# Patient Record
Sex: MALE | Race: WHITE | NOT HISPANIC OR LATINO | Employment: FULL TIME | URBAN - METROPOLITAN AREA
[De-identification: names, ages, dates, MRNs, and addresses within clinical notes are randomized per-mention and may not be internally consistent; named-entity substitution may affect disease eponyms.]

---

## 2017-02-01 ENCOUNTER — ALLSCRIPTS OFFICE VISIT (OUTPATIENT)
Dept: OTHER | Facility: OTHER | Age: 47
End: 2017-02-01

## 2017-02-01 ENCOUNTER — HOSPITAL ENCOUNTER (OUTPATIENT)
Dept: RADIOLOGY | Facility: HOSPITAL | Age: 47
Discharge: HOME | End: 2017-02-01
Attending: FAMILY MEDICINE
Payer: COMMERCIAL

## 2017-02-01 ENCOUNTER — TRANSCRIBE ORDERS (OUTPATIENT)
Dept: ADMINISTRATIVE | Facility: HOSPITAL | Age: 47
End: 2017-02-01

## 2017-02-01 DIAGNOSIS — M79.671 PAIN OF RIGHT FOOT: ICD-10-CM

## 2017-02-01 PROCEDURE — 73650 X-RAY EXAM OF HEEL: CPT

## 2017-02-01 PROCEDURE — 73630 X-RAY EXAM OF FOOT: CPT

## 2017-02-02 ENCOUNTER — GENERIC CONVERSION - ENCOUNTER (OUTPATIENT)
Dept: OTHER | Facility: OTHER | Age: 47
End: 2017-02-02

## 2017-03-29 ENCOUNTER — ALLSCRIPTS OFFICE VISIT (OUTPATIENT)
Dept: OTHER | Facility: OTHER | Age: 47
End: 2017-03-29

## 2017-04-05 ENCOUNTER — GENERIC CONVERSION - ENCOUNTER (OUTPATIENT)
Dept: OTHER | Facility: OTHER | Age: 47
End: 2017-04-05

## 2017-04-05 LAB
A/G RATIO (HISTORICAL): 2.2 (ref 1.2–2.2)
ALBUMIN SERPL BCP-MCNC: 4.7 G/DL (ref 3.5–5.5)
ALP SERPL-CCNC: 88 IU/L (ref 39–117)
ALT SERPL W P-5'-P-CCNC: 57 IU/L (ref 0–44)
AMBIG ABBREV CMP14 DEFAULT (HISTORICAL): NORMAL
AMBIG ABBREV LP DEFAULT (HISTORICAL): NORMAL
AST SERPL W P-5'-P-CCNC: 27 IU/L (ref 0–40)
BASOPHILS # BLD AUTO: 0 %
BASOPHILS # BLD AUTO: 0 X10E3/UL (ref 0–0.2)
BILIRUB SERPL-MCNC: 0.4 MG/DL (ref 0–1.2)
BUN SERPL-MCNC: 13 MG/DL (ref 6–24)
BUN/CREA RATIO (HISTORICAL): 14 (ref 9–20)
CALCIUM SERPL-MCNC: 9.5 MG/DL (ref 8.7–10.2)
CHLORIDE SERPL-SCNC: 99 MMOL/L (ref 96–106)
CHOLEST SERPL-MCNC: 181 MG/DL (ref 100–199)
CO2 SERPL-SCNC: 24 MMOL/L (ref 18–29)
CREAT SERPL-MCNC: 0.92 MG/DL (ref 0.76–1.27)
DEPRECATED RDW RBC AUTO: 13.2 % (ref 12.3–15.4)
EGFR AFRICAN AMERICAN (HISTORICAL): 114 ML/MIN/1.73
EGFR-AMERICAN CALC (HISTORICAL): 99 ML/MIN/1.73
EOSINOPHIL # BLD AUTO: 0.4 X10E3/UL (ref 0–0.4)
EOSINOPHIL # BLD AUTO: 5 %
GLUCOSE SERPL-MCNC: 106 MG/DL (ref 65–99)
HCT VFR BLD AUTO: 47.5 % (ref 37.5–51)
HDLC SERPL-MCNC: 37 MG/DL
HGB BLD-MCNC: 16.4 G/DL (ref 12.6–17.7)
IMM.GRANULOCYTES (CD4/8) (HISTORICAL): 0 %
IMM.GRANULOCYTES (CD4/8) (HISTORICAL): 0 X10E3/UL (ref 0–0.1)
LDLC SERPL CALC-MCNC: 101 MG/DL (ref 0–99)
LYMPHOCYTES # BLD AUTO: 2.1 X10E3/UL (ref 0.7–3.1)
LYMPHOCYTES # BLD AUTO: 28 %
MCH RBC QN AUTO: 29.9 PG (ref 26.6–33)
MCHC RBC AUTO-ENTMCNC: 34.5 G/DL (ref 31.5–35.7)
MCV RBC AUTO: 87 FL (ref 79–97)
MONOCYTES # BLD AUTO: 0.6 X10E3/UL (ref 0.1–0.9)
MONOCYTES (HISTORICAL): 8 %
NEUTROPHILS # BLD AUTO: 4.4 X10E3/UL (ref 1.4–7)
NEUTROPHILS # BLD AUTO: 59 %
PLATELET # BLD AUTO: 191 X10E3/UL (ref 150–379)
POTASSIUM SERPL-SCNC: 4.5 MMOL/L (ref 3.5–5.2)
RBC (HISTORICAL): 5.49 X10E6/UL (ref 4.14–5.8)
SODIUM SERPL-SCNC: 141 MMOL/L (ref 134–144)
TOT. GLOBULIN, SERUM (HISTORICAL): 2.1 G/DL (ref 1.5–4.5)
TOTAL PROTEIN (HISTORICAL): 6.8 G/DL (ref 6–8.5)
TRIGL SERPL-MCNC: 215 MG/DL (ref 0–149)
VLDLC SERPL CALC-MCNC: 43 MG/DL (ref 5–40)
WBC # BLD AUTO: 7.5 X10E3/UL (ref 3.4–10.8)

## 2017-04-06 ENCOUNTER — GENERIC CONVERSION - ENCOUNTER (OUTPATIENT)
Dept: OTHER | Facility: OTHER | Age: 47
End: 2017-04-06

## 2017-04-06 DIAGNOSIS — R94.5 ABNORMAL RESULTS OF LIVER FUNCTION STUDIES: ICD-10-CM

## 2017-10-11 ENCOUNTER — ALLSCRIPTS OFFICE VISIT (OUTPATIENT)
Dept: OTHER | Facility: OTHER | Age: 47
End: 2017-10-11

## 2017-10-11 DIAGNOSIS — R53.83 OTHER FATIGUE: ICD-10-CM

## 2017-10-11 DIAGNOSIS — Z00.00 ENCOUNTER FOR GENERAL ADULT MEDICAL EXAMINATION WITHOUT ABNORMAL FINDINGS: ICD-10-CM

## 2017-10-11 DIAGNOSIS — I10 ESSENTIAL (PRIMARY) HYPERTENSION: ICD-10-CM

## 2017-10-11 DIAGNOSIS — E55.9 VITAMIN D DEFICIENCY: ICD-10-CM

## 2018-01-11 NOTE — PROGRESS NOTES
Assessment    1  Family history of hypertension (V17 49) (Z82 49) : Father, Brother   2  Encounter for preventive health examination (V70 0) (Z00 00)   3  Benign essential hypertension (401 1) (I10)    Plan  Benign essential hypertension, Health Maintenance    · (1) CBC/PLT/DIFF; Status:Active; Requested for:36Crz1609;    · (1) COMPREHENSIVE METABOLIC PANEL; Status:Active; Requested for:09Dsh5236;    · (1) LIPID PANEL, FASTING; Status:Active; Requested for:73Ojw4374;    · Follow-up visit in 6 months Evaluation and Treatment  Follow-up  Status: Hold For -  Scheduling  Requested for: 38Jgx7295  Flu vaccine need    · Fluzone Quadrivalent 0 5 ML Intramuscular Suspension Prefilled Syringe    Discussion/Summary  Impression: health maintenance visit, healthy adult male  Currently, he eats a healthy diet and has an inadequate exercise regimen  Prostate cancer screening: PSA is not indicated  Testicular cancer screening: the risks and benefits of testicular cancer screening were discussed, self testicular exam technique was taught, monthly self testicular exam was advised, testicular cancer screening is current and clinical testicular exam was done today  Colorectal cancer screening: colorectal cancer screening is not indicated  Screening lab work includes glucose, lipid profile and CBC ordered  The risks and benefits of immunizations were discussed, immunizations are up to date and Flu Vaccine was given today, and tolerated well  He was advised to be evaluated by a dentist  Advice and education were given regarding nutrition, aerobic exercise, weight loss and Pt to focus on a low carb / sodium diet, and regular exercise  Patient discussion: discussed with the patient, Acey Light is going to f/u in 6 months, or sooner PRN  Chief Complaint  Pt here for a wellness, previous pt Dr Elizabeth Cadet  History of Present Illness  , Adult Male: The patient is being seen for a health maintenance evaluation   The last health maintenance visit was year(s) ago  General Health: The patient's health since the last visit is described as good   PMHx: HTN  No surgeries  He does not have regular dental visits  He denies vision problems  He denies hearing loss  Immunizations status: up to date   Recommended that he sees a Dentist    Lifestyle:  He consumes a diverse and healthy diet  He does not have any weight concerns  He exercises regularly  He does not use tobacco  The patient is a former cigarette smoker  He denies alcohol use  He denies drug use  He does exercise  Reproductive health:  the patient is sexually active  He denies erectile dysfunction  Screening: cancer screening reviewed and current  metabolic screening reviewed and current  risk screening reviewed and current  Additional History:  Chronic issues with his allergies - using Flonase  No CP/SOB  No abd pain or rectal bleeding  Urine flow is normal  No ED  No anxiety or depression  No colon cancer; pt's father did have a dysplastic rectal polyp  HPI: As above  Review of Systems    Constitutional: as noted in HPI    ENT: as noted in HPI  Cardiovascular: as noted in HPI  Respiratory: as noted in HPI  Gastrointestinal: as noted in HPI  Genitourinary: as noted in HPI  Psychiatric: as noted in HPI  Active Problems    1  Allergic rhinitis (477 9) (J30 9)   2  Anxiety (300 00) (F41 9)   3  Benign essential hypertension (401 1) (I10)   4   Skin tag (701 9) (L91 8)    Past Medical History    · History of Acute laryngitis (464 00) (J04 0)   · Acute sinusitis (461 9) (J01 90)   · History of Dysfunction of eustachian tube, left (381 81) (H69 82)   · History of Dysfunction of eustachian tube, unspecified laterality (381 81) (H69 80)   · History of acute sinusitis (V12 69) (Z87 09)   · History of influenza (V12 09) (Z87 09)   · History of Plantar wart (078 12) (B07 0)   · History of Right knee pain (719 46) (M25 561)   · History of Skin rash (782 1) (R21)   · History of Strain of muscle or tendon of peroneal muscle group at lower leg level (844 8)  (Z28 372A)    Family History  Mother    · Family history of Diabetes Mellitus (V18 0)  Father    · Family history of Benign Essential Hypertension    Social History    · Denied: History of Alcohol   · Denied: History of Drug Use   · Education History   · COLLEGE GRAD   · Former smoker (X94 96) (Z48 403)   · Quit 1998   · Marital History -  (V61 03)   · 1 step-child, 1 child   · Occupation:   ·     Current Meds   1  Fluticasone Propionate 50 MCG/ACT Nasal Suspension; Use two (2) spray(s) into EACH   nostril DAILY during allergy season; Therapy: 54DFL5818 to (Evaluate:12Ian6628)  Requested for: 55MTA1674 Recorded   2  Lisinopril 5 MG Oral Tablet; TAKE ONE (1) TABLET(S) DAILY; Therapy: 17ECN4660 to (Evaluate:84Atz6527)  Requested for: 46BQB1056; Last   Rx:78Bgh1582 Ordered    Allergies    1  No Known Drug Allergies    Vitals   Recorded: 37DNZ8103 37:90LB   Systolic 268   Diastolic 78   Heart Rate 64   Respiration 16   Height 5 ft 10 55 in   Weight 217 lb    BMI Calculated 30 65   BSA Calculated 2 17     Physical Exam    Constitutional   General appearance: No acute distress, well appearing and well nourished  NAD; VSS; pleasant  Head and Face   Head and face: Normal     Eyes   Conjunctiva and lids: No erythema, swelling or discharge  Ears, Nose, Mouth, and Throat   External inspection of ears and nose: Normal     Otoscopic examination: Tympanic membranes translucent with normal light reflex  Canals patent without erythema  Hearing: Normal     Lips, teeth, and gums: Normal, good dentition  Pt does need a dental cleaning  Oropharynx: Normal with no erythema, edema, exudate or lesions  Neck   Neck: Supple, symmetric, trachea midline, no masses  Pulmonary   Respiratory effort: No increased work of breathing or signs of respiratory distress  Auscultation of lungs: Clear to auscultation  Cardiovascular   Auscultation of heart: Normal rate and rhythm, normal S1 and S2, no murmurs  Abdomen   Abdomen: Non-tender, no masses  Liver and spleen: No hepatomegaly or splenomegaly  Examination for hernias: No hernias appreciated  Genitourinary   Scrotal contents: Normal testes, no masses  Penis: Normal, no lesions  Lymphatic   Palpation of lymph nodes in neck: No lymphadenopathy  Musculoskeletal   Gait and station: Normal     Psychiatric   Judgment and insight: Normal     Orientation to person, place and time: Normal     Recent and remote memory: Intact      Mood and affect: Normal        Future Appointments    Date/Time Provider Specialty Site   03/29/2017 08:30 AM Yu Krishna DO Family Medicine [facility]     Signatures   Electronically signed by : Roselyn Ramírez DO; Sep 29 2016 10:24AM EST                       (Author)

## 2018-01-12 VITALS
SYSTOLIC BLOOD PRESSURE: 124 MMHG | DIASTOLIC BLOOD PRESSURE: 78 MMHG | BODY MASS INDEX: 31.83 KG/M2 | HEART RATE: 84 BPM | HEIGHT: 71 IN | WEIGHT: 227.38 LBS | RESPIRATION RATE: 16 BRPM

## 2018-01-12 NOTE — RESULT NOTES
Verified Results  * XR HEEL / CALCANEUS 2+ VIEW RIGHT 56Itz0269 09:25AM [facility] Order Number: [id number]     Test Name Result Flag Reference   XR HEEL / CALCANEUS 2+ VW RIGHT (Report)     RIGHT HEEL     INDICATION: Right heel pain  COMPARISON: None     VIEWS: Axial and lateral; 2 images     FINDINGS:     There is no acute fracture or dislocation  Small plantar calcaneal spur  No lytic or blastic lesions are seen  Soft tissues are unremarkable  IMPRESSION:     No acute osseous abnormality  Workstation performed: [id number]     Signed by:   Jose Barron MD   2/2/17     * XR FOOT 3+ VIEW RIGHT 66GVY4702 09:25AM [facility] Order Number: [id number]   Performing Comments: Pt with chronic pain of the right lenin-plantar aspect of the right heel  Thanks  Mary Marcus DO     Test Name Result Flag Reference   XR FOOT 3+ VW RIGHT (Report)     RIGHT FOOT     INDICATION: Right foot pain  COMPARISON: None     VIEWS: AP, lateral and oblique ; 3 images     FINDINGS:     There is no acute fracture or dislocation  Small plantar calcaneal spur  No lytic or blastic lesions are seen  Soft tissues are unremarkable  IMPRESSION:     No acute osseous abnormality  Workstation performed: [id number]     Signed by:   Jose Barron MD   2/2/17

## 2018-01-13 VITALS
HEART RATE: 72 BPM | HEIGHT: 71 IN | BODY MASS INDEX: 31.64 KG/M2 | RESPIRATION RATE: 16 BRPM | WEIGHT: 226 LBS | DIASTOLIC BLOOD PRESSURE: 86 MMHG | SYSTOLIC BLOOD PRESSURE: 128 MMHG

## 2018-01-14 NOTE — RESULT NOTES
Verified Results  (1) CBC/PLT/DIFF 93KOZ2830 08:01AM [facility]     Test Name Result Flag Reference   WBC 6 9 x10E3/uL  3 4-10 8   RBC 5 47 x10E6/uL  4 14-5 80   Hemoglobin 16 1 g/dL  12 6-17 7   Hematocrit 47 2 %  37 5-51 0   MCV 86 fL  79-97   MCH 29 4 pg  26 6-33 0   MCHC 34 1 g/dL  31 5-35 7   RDW 13 2 %  12 3-15 4   Platelets 717 W32D9/YT  150-379   Neutrophils 60 %     Lymphs 29 %     Monocytes 7 %     Eos 4 %     Basos 0 %     Neutrophils (Absolute) 4 1 x10E3/uL  1 4-7 0   Lymphs (Absolute) 2 0 x10E3/uL  0 7-3 1   Monocytes(Absolute) 0 5 x10E3/uL  0 1-0 9   Eos (Absolute) 0 3 x10E3/uL  0 0-0 4   Baso (Absolute) 0 0 x10E3/uL  0 0-0 2   Immature Granulocytes 0 %     Immature Grans (Abs) 0 0 x10E3/uL  0 0-0 1     (1) COMPREHENSIVE METABOLIC PANEL 50HVW2178 96:23AA [facility]     Test Name Result Flag Reference   Glucose, Serum 98 mg/dL  65-99   BUN 12 mg/dL  6-24   Creatinine, Serum 0 82 mg/dL  0 76-1 27   eGFR If NonAfricn Am 107 mL/min/1 73  >59   eGFR If Africn Am 123 mL/min/1 73  >59   BUN/Creatinine Ratio 15  9-20   Sodium, Serum 140 mmol/L  134-144   Potassium, Serum 4 8 mmol/L  3 5-5 2   Chloride, Serum 99 mmol/L     Carbon Dioxide, Total 22 mmol/L  18-29   Calcium, Serum 9 7 mg/dL  8 7-10 2   Protein, Total, Serum 7 1 g/dL  6 0-8 5   Albumin, Serum 4 5 g/dL  3 5-5 5   Globulin, Total 2 6 g/dL  1 5-4 5   A/G Ratio 1 7  1 1-2 5   Bilirubin, Total 0 6 mg/dL  0 0-1 2   Alkaline Phosphatase, S 81 IU/L     AST (SGOT) 31 IU/L  0-40   ALT (SGPT) 45 IU/L H 0-44     (1) TSH WITH FT4 REFLEX [id number] 08:01AM [facility]     Test Name Result Flag Reference   TSH 1 130 uIU/mL  0 450-4 500     (1) URINALYSIS (will reflex a microscopy if leukocytes, occult blood, protein or nitrites are not within normal limits) 49GEW1948 08:01AM [facility]     Test Name Result Flag Reference   Specific Gravity 1 019  1 005-1 030   pH 6 0  5 0-7 5   Urine-Color Yellow  Yellow   Appearance Clear Clear   WBC Esterase Negative  Negative   Protein Negative  Negative/Trace   Glucose Negative  Negative   Ketones Negative  Negative   Occult Blood Negative  Negative   Bilirubin Negative  Negative   Urobilinogen,Semi-Qn 0 2 EU/dL  0 2-1 0   Nitrite, Urine Negative  Negative   Microscopic Examination Comment     Microscopic follows if indicated  [facility]) Lipid Panel 99FUB4142 08:01AM [facility]     Test Name Result Flag Reference   Cholesterol, Total 161 mg/dL  100-199   Triglycerides 113 mg/dL  0-149   HDL Cholesterol 39 mg/dL L >39   According to ATP-III Guidelines, HDL-C >59 mg/dL is considered a  negative risk factor for CHD  VLDL Cholesterol Cali 23 mg/dL  5-40   LDL Cholesterol Calc 99 mg/dL  0-99     (1) LDL,DIRECT 70KUG5322 08:01AM [facility]     Test Name Result Flag Reference   LDL Chol   (Direct) 99 mg/dL  0-99

## 2018-01-14 NOTE — PROGRESS NOTES
Assessment    1  Encounter for preventive health examination (V70 0) (Z00 00)   2  Benign essential hypertension (401 1) (I10)   3  Colon cancer screening (V76 51) (Z12 11)   4  Vitamin D deficiency (268 9) (E55 9)   5  Fatigue (780 79) (R53 83)    Plan  Benign essential hypertension, Health Maintenance    · (1) COMPREHENSIVE METABOLIC PANEL; Status:Active; Requested for:11Oct2017;    · (1) LIPID PANEL, FASTING; Status:Active; Requested for:11Oct2017;    · Follow-up visit in 6 months Evaluation and Treatment  Follow-up  Status: Complete   Done: 40SZE8289   · Influenza  Colon cancer screening    · 2 - Ace Party MD, Betty Henley  Colorectal Surgery, Gastroenterology Co-Management  *   51 yo male, with father - with hx of dysplastic colonic polyp in the past  - requesting CR Surgery for evaluation  Thanks  Kristen Chi DO  Status: Complete   Done: 06HLV6694  Care Summary provided  : Yes  Fatigue, Vitamin D deficiency    · (1) CBC/PLT/DIFF; Status:Active; Requested for:11Oct2017;    · (1) TSH WITH FT4 REFLEX; Status:Active; Requested for:11Oct2017;    · (1) VITAMIN D 25-HYDROXY; Status:Active; Requested for:11Oct2017;     Discussion/Summary  Impression: health maintenance visit, healthy adult male  Currently, he eats a healthy diet and has an inadequate exercise regimen  Prostate cancer screening: PSA is not indicated  Colorectal cancer screening: the risks and benefits of colorectal cancer screening were discussed and Pt was referred to CR Surgery for an evaluation - given his family hx  Screening lab work includes glucose, lipid profile, 25-hydroxyvitamin D and CBC and TSH  The risks and benefits of immunizations were discussed, immunizations are up to date and Flu Vaccine was given IM today, and tolerated well  Advice and education were given regarding nutrition, aerobic exercise, weight loss and Elwanda Grit is to focus on a lower carb / low salt diet, regular exercise, and working on weight loss   Patient discussion: discussed with the patient, He is to f/u in 6 months, or sooner PRN  BP is adequately controlled on present management  Chief Complaint  PT is being seen today for a HM visit  BP on his wrist cuff - left 126/84, pulse of 87 in clinic today  History of Present Illness  HM, Adult Male: The patient is being seen for a health maintenance evaluation  The last health maintenance visit was 1 year(s) ago  General Health: The patient's health since the last visit is described as good   Allergies doing pretty well right now  Were worse in 09/2017  He has regular dental visits  He denies vision problems  He denies hearing loss  Immunizations status: up to date  Lifestyle:  He consumes a diverse and healthy diet  He does not have any weight concerns  He does not exercise regularly  He does not use tobacco  The patient is a former cigarette smoker  He consumes alcohol  He reports occasional alcohol use  He denies drug use  Reproductive health:  the patient is sexually active  He denies erectile dysfunction  Screening: cancer screening reviewed and current  metabolic screening reviewed and current  risk screening reviewed and current  Additional History:  No CP/SOB  No abd pain or rectal bleeding at this time (has had previously; we re-discussed his family hx today - father with hx of a dysplastic colonic polyp in the past)  Urine flow is fine  No ED  No anxiety or depression  HPI: As above  Review of Systems    Constitutional: as noted in HPI  Cardiovascular: as noted in HPI  Respiratory: as noted in HPI  Gastrointestinal: as noted in HPI  Genitourinary: as noted in HPI  Psychiatric: as noted in HPI  Active Problems    1  Acute maxillary sinusitis (461 0) (J01 00)   2  Allergic rhinitis (477 9) (J30 9)   3  Anxiety (300 00) (F41 9)   4  Benign essential hypertension (401 1) (I10)   5  Elevated LFTs (790 6) (R79 89)   6  Flu vaccine need (V04 81) (Z23)   7   Heel pain, chronic, right (662 8,391 85) (M79 671,G89 29)   8  Skin tag (701 9) (L91 8)   9  Vertigo (780 4) (R42)    Past Medical History    · History of Acute laryngitis (464 00) (J04 0)   · Acute sinusitis (461 9) (J01 90)   · History of Dysfunction of eustachian tube, left (381 81) (H69 82)   · History of Dysfunction of Eustachian tube, unspecified laterality (381 81) (H69 80)   · History of acute sinusitis (V12 69) (Z87 09)   · History of influenza (V12 09) (Z87 09)   · History of Plantar wart (078 12) (B07 0)   · History of Right knee pain (719 46) (M25 561)   · History of Skin rash (782 1) (R21)   · History of Strain of muscle or tendon of peroneal muscle group at lower leg level (844 8)  (S86 319A)    Family History  Mother    · Family history of Diabetes Mellitus (V18 0)  Father    · Family history of Benign Essential Hypertension   · Family history of hypertension (V17 49) (Z82 49)  Brother    · Family history of hypertension (V17 49) (Z82 49)    Social History    · Denied: History of Alcohol   · Denied: History of Drug Use   · Education History   · COLLEGE GRAD   · Former smoker (Q65 94) (J20 385)   · Quit 1998   · Marital History -  (V61 03)   · 1 step-child, 1 child   · Occupation:   ·     Current Meds   1  Fluticasone Propionate 50 MCG/ACT Nasal Suspension; Use two (2) spray(s) into EACH   nostril DAILY during allergy season; Therapy: 44ZDN5930 to (Larissa Santos)  Requested for: 77LSV1944; Last   Rx:40Clb1888 Ordered   2  Lisinopril 5 MG Oral Tablet; TAKE ONE (1) TABLET(S) DAILY; Therapy: 54VQB4791 to ()  Requested for: 95PVL9795; Last   Rx:40Vlq5023 Ordered    Allergies    1   No Known Drug Allergies    Vitals   Recorded: 40IMI4016 08:33AM   Heart Rate 88   Respiration 16   Systolic 845   Diastolic 88   Height 5 ft 10 87 in   Weight 218 lb 4 oz   BMI Calculated 30 55   BSA Calculated 2 19     Physical Exam    Constitutional   General appearance: No acute distress, well appearing and well nourished  NAD; VSS; pleasant  Head and Face   Head and face: Normal     Eyes   Conjunctiva and lids: No erythema, swelling or discharge  Ears, Nose, Mouth, and Throat   External inspection of ears and nose: Normal     Otoscopic examination: Tympanic membranes translucent with normal light reflex  Canals patent without erythema  Hearing: Normal     Lips, teeth, and gums: Normal, good dentition  Oropharynx: Normal with no erythema, edema, exudate or lesions  Neck   Neck: Supple, symmetric, trachea midline, no masses  Pulmonary   Respiratory effort: No increased work of breathing or signs of respiratory distress  Auscultation of lungs: Clear to auscultation  Cardiovascular   Auscultation of heart: Normal rate and rhythm, normal S1 and S2, no murmurs  Abdomen   Abdomen: Non-tender, no masses  Liver and spleen: No hepatomegaly or splenomegaly  Musculoskeletal   Gait and station: Normal     Psychiatric   Judgment and insight: Normal     Orientation to person, place and time: Normal     Recent and remote memory: Intact      Mood and affect: Normal        Future Appointments    Date/Time Provider Specialty Site   04/11/2018 08:45 AM Mena Salinas DO Family Medicine [id number] [facility]     Signatures   Electronically signed by : Oj Higgins DO; Oct 11 2017 12:41PM EST                       (Author)

## 2018-01-15 NOTE — RESULT NOTES
Verified Results  (1) CBC/PLT/DIFF 05Apr2017 07:31AM Omar Sheldon     Test Name Result Flag Reference   WBC 7 5 x10E3/uL  3 4-10 8   RBC 5 49 x10E6/uL  4 14-5 80   Hemoglobin 16 4 g/dL  12 6-17 7   Hematocrit 47 5 %  37 5-51 0   MCV 87 fL  79-97   MCH 29 9 pg  26 6-33 0   MCHC 34 5 g/dL  31 5-35 7   RDW 13 2 %  12 3-15 4   Platelets 050 W87O4/NA  150-379   Neutrophils 59 %     Lymphs 28 %     Monocytes 8 %     Eos 5 %     Basos 0 %     Neutrophils (Absolute) 4 4 x10E3/uL  1 4-7 0   Lymphs (Absolute) 2 1 x10E3/uL  0 7-3 1   Monocytes(Absolute) 0 6 x10E3/uL  0 1-0 9   Eos (Absolute) 0 4 x10E3/uL  0 0-0 4   Baso (Absolute) 0 0 x10E3/uL  0 0-0 2   Immature Granulocytes 0 %     Immature Grans (Abs) 0 0 x10E3/uL  0 0-0 1     (1) COMPREHENSIVE METABOLIC PANEL 99JXR0291 97:37UK Omar Sheldon     Test Name Result Flag Reference   Glucose, Serum 106 mg/dL H 65-99   BUN 13 mg/dL  6-24   Creatinine, Serum 0 92 mg/dL  0 76-1 27   eGFR If NonAfricn Am 99 mL/min/1 73  >59   eGFR If Africn Am 114 mL/min/1 73  >59   BUN/Creatinine Ratio 14  9-20   **Please note reference interval change**   Sodium, Serum 141 mmol/L  134-144   Potassium, Serum 4 5 mmol/L  3 5-5 2   Chloride, Serum 99 mmol/L     Carbon Dioxide, Total 24 mmol/L  18-29   Calcium, Serum 9 5 mg/dL  8 7-10 2   Protein, Total, Serum 6 8 g/dL  6 0-8 5   Albumin, Serum 4 7 g/dL  3 5-5 5   Globulin, Total 2 1 g/dL  1 5-4 5   A/G Ratio 2 2  1 2-2 2   **Please note reference interval change**   Bilirubin, Total 0 4 mg/dL  0 0-1 2   Alkaline Phosphatase, S 88 IU/L     AST (SGOT) 27 IU/L  0-40   ALT (SGPT) 57 IU/L H 0-44     (LC) Lipid Panel 05Apr2017 07:31AM Omar Sheldon     Test Name Result Flag Reference   Cholesterol, Total 181 mg/dL  100-199   Triglycerides 215 mg/dL H 0-149   HDL Cholesterol 37 mg/dL L >39   VLDL Cholesterol Cali 43 mg/dL H 5-40   LDL Cholesterol Calc 101 mg/dL H 0-99     (LC) Addis Lafleur CMP14 Default 05Apr2017 07:31AM Monalisa Omar     Test Name Result Flag Reference   Tam Marin CMP14 Default Comment     A hand-written panel/profile was received from your office  In  accordance with the [facility] Ambiguous Test Code Policy dated July 7587, we have completed your order by using the closest currently  or formerly recognized AMA panel  We have assigned Comprehensive  Metabolic Panel (14), Test Code #971720 to this request   If this  is not the testing you wished to receive on this specimen, please  contact the [address] [place] [address] Client Inquiry/Technical Services Department  to clarify the test order  We appreciate your business  [facility]) Tam Marin LP Default [id number] 07:31AM Omar Sheldon     Test Name Result Flag Reference   Ambig Abbrev LP Default Comment     A hand-written panel/profile was received from your office  In  accordance with the [facility] Ambiguous Test Code Policy dated July 8905, we have completed your order by using the closest currently  or formerly recognized AMA panel  We have assigned Lipid Panel,  Test Code #[id number] to this request  If this is not the testing you  wished to receive on this specimen, please contact the [address] [place] [address]  Client Inquiry/Technical Services Department to clarify the test  order  We appreciate your business         Plan  Elevated LFTs    · US RIGHT UPPER QUADRANT; Status:Hold For - Scheduling; Requested  for:06Apr2017;

## 2018-01-22 VITALS
HEIGHT: 71 IN | WEIGHT: 218.25 LBS | SYSTOLIC BLOOD PRESSURE: 130 MMHG | HEART RATE: 88 BPM | RESPIRATION RATE: 16 BRPM | DIASTOLIC BLOOD PRESSURE: 88 MMHG | BODY MASS INDEX: 30.56 KG/M2

## 2018-01-30 DIAGNOSIS — I10 ESSENTIAL HYPERTENSION: Primary | ICD-10-CM

## 2018-01-30 RX ORDER — LISINOPRIL 5 MG/1
TABLET ORAL
Qty: 90 TABLET | Refills: 3 | Status: SHIPPED | OUTPATIENT
Start: 2018-01-30 | End: 2019-01-26 | Stop reason: SDUPTHER

## 2018-04-18 ENCOUNTER — OFFICE VISIT (OUTPATIENT)
Dept: FAMILY MEDICINE CLINIC | Facility: CLINIC | Age: 48
End: 2018-04-18

## 2018-04-18 VITALS
DIASTOLIC BLOOD PRESSURE: 88 MMHG | BODY MASS INDEX: 31.41 KG/M2 | WEIGHT: 224.4 LBS | SYSTOLIC BLOOD PRESSURE: 136 MMHG | HEART RATE: 84 BPM | RESPIRATION RATE: 16 BRPM

## 2018-04-18 DIAGNOSIS — R53.83 FATIGUE, UNSPECIFIED TYPE: ICD-10-CM

## 2018-04-18 DIAGNOSIS — I10 ESSENTIAL HYPERTENSION: Primary | Chronic | ICD-10-CM

## 2018-04-18 DIAGNOSIS — E55.9 VITAMIN D DEFICIENCY: ICD-10-CM

## 2018-04-18 PROCEDURE — 3075F SYST BP GE 130 - 139MM HG: CPT | Performed by: FAMILY MEDICINE

## 2018-04-18 PROCEDURE — 3079F DIAST BP 80-89 MM HG: CPT | Performed by: FAMILY MEDICINE

## 2018-04-18 PROCEDURE — 99213 OFFICE O/P EST LOW 20 MIN: CPT | Performed by: FAMILY MEDICINE

## 2018-04-18 NOTE — PROGRESS NOTES
Assessment/Plan:    No problem-specific Assessment & Plan notes found for this encounter  Diagnoses and all orders for this visit:    Essential hypertension  Comments:  Reasonable control on present management  Payton Lott is to work on a lower carb / salt diet, getting back to regular exercise, and weight loss  Orders:  -     CBC and differential; Future  -     Comprehensive metabolic panel; Future  -     Lipid Panel with Direct LDL reflex; Future  -     TSH, 3rd generation with T4 reflex; Future  -     Vitamin D 25 hydroxy; Future    Vitamin D deficiency  -     CBC and differential; Future  -     Comprehensive metabolic panel; Future  -     Lipid Panel with Direct LDL reflex; Future  -     TSH, 3rd generation with T4 reflex; Future  -     Vitamin D 25 hydroxy; Future    Fatigue, unspecified type  -     CBC and differential; Future  -     Comprehensive metabolic panel; Future  -     Lipid Panel with Direct LDL reflex; Future  -     TSH, 3rd generation with T4 reflex; Future  -     Vitamin D 25 hydroxy; Future          Subjective:      Patient ID: Deng Breaker is a 50 y o  male  Payton Lott is here in f/u today on his BP  Feeling well  No regular exercise still  He does need to complete FBW (previously ordered)  Continues Lisinopril daily, low dose  The following portions of the patient's history were reviewed and updated as appropriate: allergies, current medications, past family history, past social history, past surgical history and problem list     No past medical history on file  Current Outpatient Prescriptions:     lisinopril (ZESTRIL) 5 mg tablet, TAKE 1 TABLET BY MOUTH DAILY, Disp: 90 tablet, Rfl: 3    No Known Allergies    Review of Systems   Constitutional: Negative for activity change  Respiratory: Negative for cough and shortness of breath  Cardiovascular: Negative for chest pain           Objective:      /88 (BP Location: Right arm, Patient Position: Sitting, Cuff Size: Standard)   Pulse 84   Resp 16   Wt 102 kg (224 lb 6 4 oz)   BMI 31 41 kg/m²   Repeat BP = 132/82 RA seated  Physical Exam   Constitutional: He is oriented to person, place, and time  He appears well-developed and well-nourished  No distress  HENT:   Head: Normocephalic and atraumatic  Right Ear: Hearing, tympanic membrane, external ear and ear canal normal    Left Ear: Hearing, tympanic membrane, external ear and ear canal normal    Mouth/Throat: Oropharynx is clear and moist  No oropharyngeal exudate  Eyes: Conjunctivae are normal    Neck: Normal range of motion  Neck supple  No thyromegaly present  Cardiovascular: Normal rate, regular rhythm and normal heart sounds  Exam reveals no gallop and no friction rub  No murmur heard  Pulmonary/Chest: Effort normal and breath sounds normal  No respiratory distress  He has no wheezes  He has no rales  Lymphadenopathy:     He has no cervical adenopathy  Neurological: He is alert and oriented to person, place, and time  Skin: He is not diaphoretic  Psychiatric: He has a normal mood and affect  His behavior is normal  Judgment and thought content normal    Nursing note and vitals reviewed

## 2018-10-24 ENCOUNTER — OFFICE VISIT (OUTPATIENT)
Dept: FAMILY MEDICINE CLINIC | Facility: CLINIC | Age: 48
End: 2018-10-24
Payer: COMMERCIAL

## 2018-10-24 VITALS
TEMPERATURE: 97.7 F | DIASTOLIC BLOOD PRESSURE: 80 MMHG | SYSTOLIC BLOOD PRESSURE: 148 MMHG | BODY MASS INDEX: 32.42 KG/M2 | HEIGHT: 71 IN | OXYGEN SATURATION: 94 % | RESPIRATION RATE: 15 BRPM | WEIGHT: 231.6 LBS | HEART RATE: 88 BPM

## 2018-10-24 DIAGNOSIS — L60.0 INGROWN TOENAIL OF RIGHT FOOT: ICD-10-CM

## 2018-10-24 DIAGNOSIS — I10 ESSENTIAL HYPERTENSION: Primary | Chronic | ICD-10-CM

## 2018-10-24 DIAGNOSIS — R63.5 WEIGHT GAIN: ICD-10-CM

## 2018-10-24 PROCEDURE — 99213 OFFICE O/P EST LOW 20 MIN: CPT | Performed by: FAMILY MEDICINE

## 2018-10-24 PROCEDURE — 90471 IMMUNIZATION ADMIN: CPT

## 2018-10-24 PROCEDURE — 90686 IIV4 VACC NO PRSV 0.5 ML IM: CPT

## 2018-10-24 PROCEDURE — 3008F BODY MASS INDEX DOCD: CPT | Performed by: FAMILY MEDICINE

## 2018-10-24 PROCEDURE — 1036F TOBACCO NON-USER: CPT | Performed by: FAMILY MEDICINE

## 2018-10-24 RX ORDER — CEPHALEXIN 500 MG/1
500 CAPSULE ORAL EVERY 12 HOURS SCHEDULED
Qty: 20 CAPSULE | Refills: 0 | Status: SHIPPED | OUTPATIENT
Start: 2018-10-24 | End: 2018-11-03

## 2018-10-24 RX ORDER — DIPHENOXYLATE HYDROCHLORIDE AND ATROPINE SULFATE 2.5; .025 MG/1; MG/1
1 TABLET ORAL DAILY
COMMUNITY

## 2018-10-24 RX ORDER — AMOXICILLIN 500 MG
CAPSULE ORAL
COMMUNITY

## 2018-10-24 NOTE — PROGRESS NOTES
Assessment/Plan:    No problem-specific Assessment & Plan notes found for this encounter  Diagnoses and all orders for this visit:    Essential hypertension  Comments:  Stable on present management  Elena Horn is to work on a lower carb / salt diet, getting reg exercise, and weight loss  Flu Vaccine given IM, and tolerated well  Orders:  -     CBC and differential; Future  -     Lipid Panel with Direct LDL reflex; Future  -     TSH, 3rd generation with Free T4 reflex; Future  -     Comprehensive metabolic panel; Future  -     SYRINGE/SINGLE-DOSE VIAL: influenza vaccine, 8816-6905, quadrivalent, 0 5 mL, preservative-free, for patients 3+ yr (FLUZONE)    Weight gain  Comments:  FBW ordered again - TSH incl  Likely though due to diet and lack of exercise  Orders:  -     CBC and differential; Future  -     Lipid Panel with Direct LDL reflex; Future  -     TSH, 3rd generation with Free T4 reflex; Future  -     Comprehensive metabolic panel; Future    Ingrown toenail of right foot  Comments:  Treating with Cephalexin, warm soaks of the feet, and gentle massage if tissue  Pt to call if no improvement  Orders:  -     cephalexin (KEFLEX) 500 mg capsule; Take 1 capsule (500 mg total) by mouth every 12 (twelve) hours for 10 days    Other orders  -     multivitamin (THERAGRAN) TABS; Take 1 tablet by mouth daily  -     Omega-3 Fatty Acids (FISH OIL) 1200 MG CAPS; Take by mouth          Subjective:      Patient ID: Haroldo Newby is a 50 y o  male  BP's at home approx 130s/mid80s  Diet and exercise are off - commute is long, 3 total hours per day (working in the [place], [place] area now)  Works in IT  Pt has not yet done FBW  Also noting some soreness of the right great toe recently after stubbing it          The following portions of the patient's history were reviewed and updated as appropriate: allergies, past family history, past medical history, past social history, past surgical history and problem list     No past medical history on file  Current Outpatient Prescriptions:     lisinopril (ZESTRIL) 5 mg tablet, TAKE 1 TABLET BY MOUTH DAILY, Disp: 90 tablet, Rfl: 3    multivitamin (THERAGRAN) TABS, Take 1 tablet by mouth daily, Disp: , Rfl:     Omega-3 Fatty Acids (FISH OIL) 1200 MG CAPS, Take by mouth, Disp: , Rfl:     cephalexin (KEFLEX) 500 mg capsule, Take 1 capsule (500 mg total) by mouth every 12 (twelve) hours for 10 days, Disp: 20 capsule, Rfl: 0    No Known Allergies      Review of Systems   Constitutional: Negative for activity change  Respiratory: Negative for cough and shortness of breath  Cardiovascular: Negative for chest pain  Objective:      /80   Pulse 88   Temp 97 7 °F (36 5 °C)   Resp 15   Ht 5' 10 79" (1 798 m)   Wt 105 kg (231 lb 9 6 oz)   SpO2 94%   BMI 32 50 kg/m²   Repeat BP = 136/82 LA seated  Physical Exam   Constitutional: He is oriented to person, place, and time  He appears well-developed and well-nourished  No distress  HENT:   Head: Normocephalic and atraumatic  Right Ear: Hearing, tympanic membrane, external ear and ear canal normal    Left Ear: Hearing, tympanic membrane, external ear and ear canal normal    Mouth/Throat: Oropharynx is clear and moist  No oropharyngeal exudate  Eyes: Conjunctivae are normal    Neck: Normal range of motion  Neck supple  No thyromegaly present  Cardiovascular: Normal rate, regular rhythm and normal heart sounds  Exam reveals no gallop and no friction rub  No murmur heard  Pulmonary/Chest: Effort normal and breath sounds normal  No respiratory distress  He has no wheezes  He has no rales  Musculoskeletal:        Feet:    Lymphadenopathy:     He has no cervical adenopathy  Neurological: He is alert and oriented to person, place, and time  Skin: He is not diaphoretic  Psychiatric: He has a normal mood and affect   His behavior is normal  Judgment and thought content normal    Nursing note and vitals reviewed

## 2019-01-26 DIAGNOSIS — I10 ESSENTIAL HYPERTENSION: ICD-10-CM

## 2019-01-26 RX ORDER — LISINOPRIL 5 MG/1
TABLET ORAL
Qty: 90 TABLET | Refills: 3 | Status: SHIPPED | OUTPATIENT
Start: 2019-01-26 | End: 2019-05-30 | Stop reason: SDUPTHER

## 2019-04-24 ENCOUNTER — OFFICE VISIT (OUTPATIENT)
Dept: FAMILY MEDICINE CLINIC | Facility: CLINIC | Age: 49
End: 2019-04-24
Payer: COMMERCIAL

## 2019-04-24 VITALS
SYSTOLIC BLOOD PRESSURE: 128 MMHG | HEART RATE: 100 BPM | WEIGHT: 223.4 LBS | TEMPERATURE: 97.1 F | HEIGHT: 71 IN | DIASTOLIC BLOOD PRESSURE: 90 MMHG | BODY MASS INDEX: 31.27 KG/M2 | OXYGEN SATURATION: 96 %

## 2019-04-24 DIAGNOSIS — I10 ESSENTIAL HYPERTENSION: ICD-10-CM

## 2019-04-24 DIAGNOSIS — Z00.00 WELLNESS EXAMINATION: Primary | ICD-10-CM

## 2019-04-24 DIAGNOSIS — R63.5 WEIGHT GAIN: ICD-10-CM

## 2019-04-24 DIAGNOSIS — M54.50 ACUTE RIGHT-SIDED LOW BACK PAIN WITHOUT SCIATICA: ICD-10-CM

## 2019-04-24 DIAGNOSIS — Z13.1 SCREENING FOR DIABETES MELLITUS: ICD-10-CM

## 2019-04-24 PROCEDURE — 99396 PREV VISIT EST AGE 40-64: CPT | Performed by: FAMILY MEDICINE

## 2019-05-30 DIAGNOSIS — I10 ESSENTIAL HYPERTENSION: ICD-10-CM

## 2019-05-30 RX ORDER — LISINOPRIL 5 MG/1
5 TABLET ORAL DAILY
Qty: 90 TABLET | Refills: 3 | Status: SHIPPED | OUTPATIENT
Start: 2019-05-30 | End: 2020-05-13

## 2019-07-26 ENCOUNTER — TELEPHONE (OUTPATIENT)
Dept: FAMILY MEDICINE CLINIC | Facility: CLINIC | Age: 49
End: 2019-07-26

## 2019-07-26 DIAGNOSIS — J01.01 ACUTE RECURRENT MAXILLARY SINUSITIS: Primary | ICD-10-CM

## 2019-07-26 RX ORDER — AMOXICILLIN AND CLAVULANATE POTASSIUM 875; 125 MG/1; MG/1
1 TABLET, FILM COATED ORAL 2 TIMES DAILY WITH MEALS
Qty: 20 TABLET | Refills: 0 | Status: SHIPPED | OUTPATIENT
Start: 2019-07-26 | End: 2019-08-05

## 2019-07-26 NOTE — TELEPHONE ENCOUNTER
Augmentin was ordered for Ladell Srinivas - take with foods; watch for GI side effects  Thanks    Cruz

## 2019-07-26 NOTE — TELEPHONE ENCOUNTER
PT CALLED HE HAS A SINUS INFECTION THAT HE GETS AT LEAST ONCE A YEAR AND ASKED IF YOU COULD CALL SOMETHING IN [place] [address]   PLS ADVISE

## 2019-10-23 ENCOUNTER — OFFICE VISIT (OUTPATIENT)
Dept: FAMILY MEDICINE CLINIC | Facility: CLINIC | Age: 49
End: 2019-10-23
Payer: COMMERCIAL

## 2019-10-23 VITALS
HEIGHT: 71 IN | TEMPERATURE: 97.2 F | HEART RATE: 92 BPM | OXYGEN SATURATION: 98 % | BODY MASS INDEX: 29.6 KG/M2 | SYSTOLIC BLOOD PRESSURE: 118 MMHG | RESPIRATION RATE: 16 BRPM | DIASTOLIC BLOOD PRESSURE: 78 MMHG | WEIGHT: 211.4 LBS

## 2019-10-23 DIAGNOSIS — I10 ESSENTIAL HYPERTENSION: Primary | ICD-10-CM

## 2019-10-23 DIAGNOSIS — Z23 NEED FOR PROPHYLACTIC VACCINATION AND INOCULATION AGAINST INFLUENZA: ICD-10-CM

## 2019-10-23 DIAGNOSIS — J30.1 SEASONAL ALLERGIC RHINITIS DUE TO POLLEN: ICD-10-CM

## 2019-10-23 DIAGNOSIS — M79.672 CHRONIC HEEL PAIN, LEFT: ICD-10-CM

## 2019-10-23 DIAGNOSIS — G89.29 CHRONIC HEEL PAIN, LEFT: ICD-10-CM

## 2019-10-23 PROCEDURE — 3074F SYST BP LT 130 MM HG: CPT | Performed by: FAMILY MEDICINE

## 2019-10-23 PROCEDURE — 3078F DIAST BP <80 MM HG: CPT | Performed by: FAMILY MEDICINE

## 2019-10-23 PROCEDURE — 90471 IMMUNIZATION ADMIN: CPT

## 2019-10-23 PROCEDURE — 90686 IIV4 VACC NO PRSV 0.5 ML IM: CPT

## 2019-10-23 PROCEDURE — 3008F BODY MASS INDEX DOCD: CPT | Performed by: FAMILY MEDICINE

## 2019-10-23 PROCEDURE — 99214 OFFICE O/P EST MOD 30 MIN: CPT | Performed by: FAMILY MEDICINE

## 2019-10-23 NOTE — PROGRESS NOTES
Assessment/Plan:    No problem-specific Assessment & Plan notes found for this encounter  Diagnoses and all orders for this visit:    Essential hypertension  Comments:  Controlled on present management - low dose Lisinopril  He is to continue a lower salt diet, and to try to get back to regular exercise  FBW orders reprinted  Seasonal allergic rhinitis due to pollen  Comments:  Pt stable  Discussed taking OTC Claritin or Allegra daily for the next few weeks, until it gets colder  Chronic heel pain, left  Comments: Will get xrays - suspect heel spur  OTC Shoe inserts, cold treatments, warm Epsom Salts soaks, etc discussed  Orders:  -     XR heel / calcaneus 2+ vw left; Future    Need for prophylactic vaccination and inoculation against influenza  Comments:  Flu Vaccine given IM, and tolerated well  Orders:  -     influenza vaccine, [id number], quadrivalent, 0 5 mL, preservative-free, for adult and pediatric patients 6 mos+ (AFLURIA, FLUARIX, FLULAVAL, FLUZONE)          Subjective:      Patient ID: Jumana Becerra is a 52 y o  male  [address] is here today in f/u  Exercise off in recent months -> he unfortunately lost his mom, and his boss also  suddenly after a stroke, etc   Has continued to work on his diet, and did drop 12 more lbs  Allergies have been bad  The following portions of the patient's history were reviewed and updated as appropriate: allergies, current medications, past family history, past social history, past surgical history and problem list     History reviewed  No pertinent past medical history        Current Outpatient Medications:     lisinopril (ZESTRIL) 5 mg tablet, Take 1 tablet (5 mg total) by mouth daily, Disp: 90 tablet, Rfl: 3    multivitamin (THERAGRAN) TABS, Take 1 tablet by mouth daily, Disp: , Rfl:     Omega-3 Fatty Acids (FISH OIL) 1200 MG CAPS, Take by mouth, Disp: , Rfl:     Allergies   Allergen Reactions    Seasonal Ic [Cholestatin]      Social History     Tobacco Use    Smoking status: Former Smoker     Last attempt to quit:      Years since quittin 8    Smokeless tobacco: Never Used   Substance Use Topics    Alcohol use: No    Drug use: No       Review of Systems   Constitutional: Negative for activity change and fever  HENT: Positive for congestion, postnasal drip and rhinorrhea  Respiratory: Negative for cough and shortness of breath  No regular cough now  Cardiovascular: Negative for chest pain  Musculoskeletal: Positive for arthralgias  Objective:      /78 (BP Location: Right arm, Patient Position: Sitting, Cuff Size: Standard)   Pulse 92   Temp (!) 97 2 °F (36 2 °C)   Resp 16   Ht 5' 10 79" (1 798 m)   Wt 95 9 kg (211 lb 6 4 oz)   SpO2 98%   BMI 29 66 kg/m²          Physical Exam   Constitutional: He is oriented to person, place, and time  He appears well-developed and well-nourished  No distress  HENT:   Head: Normocephalic and atraumatic  Right Ear: Hearing, tympanic membrane, external ear and ear canal normal    Left Ear: Hearing, tympanic membrane, external ear and ear canal normal    Nose: Mucosal edema present  Mouth/Throat: Oropharynx is clear and moist  No oropharyngeal exudate  NM boggy and pale  Eyes: Conjunctivae are normal    Neck: Normal range of motion  Neck supple  No thyromegaly present  Cardiovascular: Normal rate, regular rhythm and normal heart sounds  Exam reveals no gallop and no friction rub  No murmur heard  Pulmonary/Chest: Effort normal and breath sounds normal  No stridor  No respiratory distress  He has no wheezes  He has no rales  Musculoskeletal:        Left ankle: Achilles tendon exhibits no pain, no defect and normal Rodriguez's test results  Left foot: There is normal range of motion and no swelling  Feet:    Lymphadenopathy:     He has no cervical adenopathy  Neurological: He is alert and oriented to person, place, and time     Skin: He is not diaphoretic  Psychiatric: He has a normal mood and affect  His behavior is normal  Judgment and thought content normal    Nursing note and vitals reviewed

## 2019-10-30 ENCOUNTER — APPOINTMENT (OUTPATIENT)
Dept: RADIOLOGY | Facility: CLINIC | Age: 49
End: 2019-10-30
Payer: COMMERCIAL

## 2019-10-30 DIAGNOSIS — G89.29 CHRONIC HEEL PAIN, LEFT: ICD-10-CM

## 2019-10-30 DIAGNOSIS — M79.672 CHRONIC HEEL PAIN, LEFT: ICD-10-CM

## 2019-10-30 PROCEDURE — 73650 X-RAY EXAM OF HEEL: CPT

## 2019-10-31 LAB
ALBUMIN SERPL-MCNC: 4.5 G/DL (ref 3.5–5.5)
ALBUMIN/GLOB SERPL: 1.7 {RATIO} (ref 1.2–2.2)
ALP SERPL-CCNC: 92 IU/L (ref 39–117)
ALT SERPL-CCNC: 41 IU/L (ref 0–44)
AST SERPL-CCNC: 21 IU/L (ref 0–40)
BASOPHILS # BLD AUTO: 0 X10E3/UL (ref 0–0.2)
BASOPHILS NFR BLD AUTO: 1 %
BILIRUB SERPL-MCNC: 0.5 MG/DL (ref 0–1.2)
BUN SERPL-MCNC: 11 MG/DL (ref 6–24)
BUN/CREAT SERPL: 11 (ref 9–20)
CALCIUM SERPL-MCNC: 9.7 MG/DL (ref 8.7–10.2)
CHLORIDE SERPL-SCNC: 102 MMOL/L (ref 96–106)
CHOLEST SERPL-MCNC: 193 MG/DL (ref 100–199)
CO2 SERPL-SCNC: 23 MMOL/L (ref 20–29)
CREAT SERPL-MCNC: 0.97 MG/DL (ref 0.76–1.27)
EOSINOPHIL # BLD AUTO: 0.3 X10E3/UL (ref 0–0.4)
EOSINOPHIL NFR BLD AUTO: 4 %
ERYTHROCYTE [DISTWIDTH] IN BLOOD BY AUTOMATED COUNT: 13.5 % (ref 12.3–15.4)
GLOBULIN SER-MCNC: 2.6 G/DL (ref 1.5–4.5)
GLUCOSE SERPL-MCNC: 110 MG/DL (ref 65–99)
HCT VFR BLD AUTO: 47.4 % (ref 37.5–51)
HDLC SERPL-MCNC: 40 MG/DL
HGB BLD-MCNC: 15.7 G/DL (ref 13–17.7)
IMM GRANULOCYTES # BLD: 0 X10E3/UL (ref 0–0.1)
IMM GRANULOCYTES NFR BLD: 0 %
LDLC SERPL CALC-MCNC: 110 MG/DL (ref 0–99)
LYMPHOCYTES # BLD AUTO: 2.1 X10E3/UL (ref 0.7–3.1)
LYMPHOCYTES NFR BLD AUTO: 27 %
MCH RBC QN AUTO: 29.3 PG (ref 26.6–33)
MCHC RBC AUTO-ENTMCNC: 33.1 G/DL (ref 31.5–35.7)
MCV RBC AUTO: 89 FL (ref 79–97)
MONOCYTES # BLD AUTO: 0.5 X10E3/UL (ref 0.1–0.9)
MONOCYTES NFR BLD AUTO: 7 %
NEUTROPHILS # BLD AUTO: 4.7 X10E3/UL (ref 1.4–7)
NEUTROPHILS NFR BLD AUTO: 61 %
PLATELET # BLD AUTO: 193 X10E3/UL (ref 150–450)
POTASSIUM SERPL-SCNC: 4.5 MMOL/L (ref 3.5–5.2)
PROT SERPL-MCNC: 7.1 G/DL (ref 6–8.5)
RBC # BLD AUTO: 5.35 X10E6/UL (ref 4.14–5.8)
SL AMB EGFR AFRICAN AMERICAN: 106 ML/MIN/1.73
SL AMB EGFR NON AFRICAN AMERICAN: 91 ML/MIN/1.73
SODIUM SERPL-SCNC: 144 MMOL/L (ref 134–144)
TRIGL SERPL-MCNC: 213 MG/DL (ref 0–149)
TSH SERPL DL<=0.005 MIU/L-ACNC: 1 UIU/ML (ref 0.45–4.5)
WBC # BLD AUTO: 7.7 X10E3/UL (ref 3.4–10.8)

## 2019-11-01 NOTE — RESULT ENCOUNTER NOTE
Please let the patient know that their left heel xrays showed no fractures or other acute bone findings  He does have some heel spurring noted, so I would do the treatments that we discussed at his appt  If he keeps having issues, let me know, and then I will refer him to Podiatry  Thanks     Dr Felipa Severino

## 2020-05-13 DIAGNOSIS — I10 ESSENTIAL HYPERTENSION: ICD-10-CM

## 2020-05-13 RX ORDER — LISINOPRIL 5 MG/1
TABLET ORAL
Qty: 90 TABLET | Refills: 1 | Status: SHIPPED | OUTPATIENT
Start: 2020-05-13 | End: 2020-11-02 | Stop reason: SDUPTHER

## 2020-07-14 ENCOUNTER — TELEMEDICINE (OUTPATIENT)
Dept: FAMILY MEDICINE CLINIC | Facility: CLINIC | Age: 50
End: 2020-07-14
Payer: COMMERCIAL

## 2020-07-14 VITALS — TEMPERATURE: 98 F | SYSTOLIC BLOOD PRESSURE: 118 MMHG | DIASTOLIC BLOOD PRESSURE: 76 MMHG

## 2020-07-14 DIAGNOSIS — J01.11 ACUTE RECURRENT FRONTAL SINUSITIS: Primary | ICD-10-CM

## 2020-07-14 DIAGNOSIS — Z13.6 SCREENING FOR CARDIOVASCULAR CONDITION: ICD-10-CM

## 2020-07-14 DIAGNOSIS — Z12.5 SCREENING FOR PROSTATE CANCER: ICD-10-CM

## 2020-07-14 DIAGNOSIS — R53.83 FATIGUE, UNSPECIFIED TYPE: ICD-10-CM

## 2020-07-14 DIAGNOSIS — Z13.1 SCREENING FOR DIABETES MELLITUS: ICD-10-CM

## 2020-07-14 PROCEDURE — 99213 OFFICE O/P EST LOW 20 MIN: CPT | Performed by: FAMILY MEDICINE

## 2020-07-14 PROCEDURE — 3074F SYST BP LT 130 MM HG: CPT | Performed by: FAMILY MEDICINE

## 2020-07-14 PROCEDURE — 1036F TOBACCO NON-USER: CPT | Performed by: FAMILY MEDICINE

## 2020-07-14 PROCEDURE — 3078F DIAST BP <80 MM HG: CPT | Performed by: FAMILY MEDICINE

## 2020-07-14 RX ORDER — AMOXICILLIN AND CLAVULANATE POTASSIUM 875; 125 MG/1; MG/1
1 TABLET, FILM COATED ORAL 2 TIMES DAILY WITH MEALS
Qty: 20 TABLET | Refills: 0 | Status: SHIPPED | OUTPATIENT
Start: 2020-07-14 | End: 2020-07-24

## 2020-07-14 NOTE — PROGRESS NOTES
Virtual Regular Visit      Assessment/Plan:    Problem List Items Addressed This Visit     None      Visit Diagnoses     Acute recurrent frontal sinusitis    -  Primary    Stable on exam   Treat with Augmentin, OTC Cough/Cold Preps PRN (Coricidin HBP), rest, and good PO hydration  Relevant Medications    amoxicillin-clavulanate (AUGMENTIN) 875-125 mg per tablet    Screening for prostate cancer        FBW ordered ofr the pt for an upcoming Wellness Exam     Relevant Orders    PSA, Total Screen    Fatigue, unspecified type        Relevant Orders    CBC and differential    TSH, 3rd generation with Free T4 reflex    Screening for diabetes mellitus        Relevant Orders    Comprehensive metabolic panel    Screening for cardiovascular condition        Relevant Orders    Lipid Panel with Direct LDL reflex               Reason for visit is   Chief Complaint   Patient presents with    Virtual Regular Visit        Encounter provider Lonny Siddiqui DO    Provider located at [address] [id number]      Recent Visits  No visits were found meeting these conditions  Showing recent visits within past 7 days and meeting all other requirements     Today's Visits  Date Type Provider Dept   07/14/20 Telemedicine DO Dieter Laughlin   Showing today's visits and meeting all other requirements     Future Appointments  No visits were found meeting these conditions  Showing future appointments within next 150 days and meeting all other requirements        The patient was identified by name and date of birth  Chai Reveal was informed that this is a telemedicine visit and that the visit is being conducted through [facility] and patient was informed that this is a secure, HIPAA-compliant platform  He agrees to proceed     My office door was closed  No one else was in the room    He acknowledged consent and understanding of privacy and security of the video platform  The patient has agreed to participate and understands they can discontinue the visit at any time  Patient is aware this is a billable service  Subjective  Kellie Monsalve is a 48 y o  male - Mary Kay Leisure has had 3 weeks of sinus pressure / pain (frontal region)  Pressure into ears  No fevers  No known COVID-19 exposures  As above  History reviewed  No pertinent past medical history  History reviewed  No pertinent surgical history  Current Outpatient Medications   Medication Sig Dispense Refill    amoxicillin-clavulanate (AUGMENTIN) 875-125 mg per tablet Take 1 tablet by mouth 2 (two) times a day with meals for 10 days 20 tablet 0    lisinopril (ZESTRIL) 5 mg tablet TAKE ONE TABLET BY MOUTH ONE TIME DAILY  90 tablet 1    multivitamin (THERAGRAN) TABS Take 1 tablet by mouth daily      Omega-3 Fatty Acids (FISH OIL) 1200 MG CAPS Take by mouth       No current facility-administered medications for this visit  Allergies   Allergen Reactions    Seasonal Ic [Cholestatin]        Review of Systems   Constitutional: Negative for activity change and fever  HENT: Positive for sinus pressure and sinus pain  Respiratory: Negative for cough and shortness of breath  Rare cough  Video Exam    Vitals:    07/14/20 1630   BP: 118/76   Temp: 98 °F (36 7 °C)   TempSrc: Tympanic       Physical Exam   Constitutional: He is oriented to person, place, and time  He appears well-developed and well-nourished  No distress  HENT:   Head: Normocephalic and atraumatic  Pt pointing to the region of the frontal sinuses where his pain is  Eyes: Conjunctivae are normal  No scleral icterus  Pulmonary/Chest: No respiratory distress  Neurological: He is alert and oriented to person, place, and time  Skin: He is not diaphoretic  Psychiatric: He has a normal mood and affect  His behavior is normal  Judgment and thought content normal    Vitals reviewed         I spent 15 minutes with patient today in which greater than 50% of the time was spent in counseling/coordination of care regarding his symptoms  VIRTUAL VISIT DISCLAIMER    Tiara Mina acknowledges that he has consented to an online visit or consultation  He understands that the online visit is based solely on information provided by him, and that, in the absence of a face-to-face physical evaluation by the physician, the diagnosis he receives is both limited and provisional in terms of accuracy and completeness  This is not intended to replace a full medical face-to-face evaluation by the physician  Tiara Mina understands and accepts these terms

## 2020-09-09 ENCOUNTER — OFFICE VISIT (OUTPATIENT)
Dept: FAMILY MEDICINE CLINIC | Facility: CLINIC | Age: 50
End: 2020-09-09
Payer: COMMERCIAL

## 2020-09-09 VITALS
DIASTOLIC BLOOD PRESSURE: 86 MMHG | WEIGHT: 234 LBS | HEART RATE: 87 BPM | OXYGEN SATURATION: 97 % | RESPIRATION RATE: 18 BRPM | HEIGHT: 71 IN | BODY MASS INDEX: 32.76 KG/M2 | SYSTOLIC BLOOD PRESSURE: 130 MMHG | TEMPERATURE: 98.9 F

## 2020-09-09 DIAGNOSIS — I10 ESSENTIAL HYPERTENSION: ICD-10-CM

## 2020-09-09 DIAGNOSIS — Z12.12 SCREENING FOR COLORECTAL CANCER: ICD-10-CM

## 2020-09-09 DIAGNOSIS — Z00.00 ANNUAL PHYSICAL EXAM: Primary | ICD-10-CM

## 2020-09-09 DIAGNOSIS — Z12.11 SCREENING FOR COLORECTAL CANCER: ICD-10-CM

## 2020-09-09 DIAGNOSIS — Z23 ENCOUNTER FOR IMMUNIZATION: ICD-10-CM

## 2020-09-09 LAB
ALBUMIN SERPL-MCNC: 4.8 G/DL (ref 4–5)
ALBUMIN/GLOB SERPL: 1.9 {RATIO} (ref 1.2–2.2)
ALP SERPL-CCNC: 85 IU/L (ref 39–117)
ALT SERPL-CCNC: 43 IU/L (ref 0–44)
AST SERPL-CCNC: 24 IU/L (ref 0–40)
BASOPHILS # BLD AUTO: 0.1 X10E3/UL (ref 0–0.2)
BASOPHILS NFR BLD AUTO: 1 %
BILIRUB SERPL-MCNC: 0.7 MG/DL (ref 0–1.2)
BUN SERPL-MCNC: 13 MG/DL (ref 6–24)
BUN/CREAT SERPL: 14 (ref 9–20)
CALCIUM SERPL-MCNC: 9.9 MG/DL (ref 8.7–10.2)
CHLORIDE SERPL-SCNC: 101 MMOL/L (ref 96–106)
CHOLEST SERPL-MCNC: 209 MG/DL (ref 100–199)
CO2 SERPL-SCNC: 25 MMOL/L (ref 20–29)
CREAT SERPL-MCNC: 0.91 MG/DL (ref 0.76–1.27)
EOSINOPHIL # BLD AUTO: 0.4 X10E3/UL (ref 0–0.4)
EOSINOPHIL NFR BLD AUTO: 5 %
ERYTHROCYTE [DISTWIDTH] IN BLOOD BY AUTOMATED COUNT: 13 % (ref 11.6–15.4)
GLOBULIN SER-MCNC: 2.5 G/DL (ref 1.5–4.5)
GLUCOSE SERPL-MCNC: 104 MG/DL (ref 65–99)
HCT VFR BLD AUTO: 50.4 % (ref 37.5–51)
HDLC SERPL-MCNC: 38 MG/DL
HGB BLD-MCNC: 16.9 G/DL (ref 13–17.7)
IMM GRANULOCYTES # BLD: 0 X10E3/UL (ref 0–0.1)
IMM GRANULOCYTES NFR BLD: 0 %
LDLC SERPL CALC-MCNC: 129 MG/DL (ref 0–99)
LYMPHOCYTES # BLD AUTO: 2.3 X10E3/UL (ref 0.7–3.1)
LYMPHOCYTES NFR BLD AUTO: 26 %
MCH RBC QN AUTO: 29.5 PG (ref 26.6–33)
MCHC RBC AUTO-ENTMCNC: 33.5 G/DL (ref 31.5–35.7)
MCV RBC AUTO: 88 FL (ref 79–97)
MONOCYTES # BLD AUTO: 0.7 X10E3/UL (ref 0.1–0.9)
MONOCYTES NFR BLD AUTO: 8 %
NEUTROPHILS # BLD AUTO: 5.2 X10E3/UL (ref 1.4–7)
NEUTROPHILS NFR BLD AUTO: 60 %
PLATELET # BLD AUTO: 207 X10E3/UL (ref 150–450)
POTASSIUM SERPL-SCNC: 5.2 MMOL/L (ref 3.5–5.2)
PROT SERPL-MCNC: 7.3 G/DL (ref 6–8.5)
PSA SERPL-MCNC: 0.6 NG/ML (ref 0–4)
RBC # BLD AUTO: 5.72 X10E6/UL (ref 4.14–5.8)
SL AMB EGFR AFRICAN AMERICAN: 113 ML/MIN/1.73
SL AMB EGFR NON AFRICAN AMERICAN: 98 ML/MIN/1.73
SODIUM SERPL-SCNC: 139 MMOL/L (ref 134–144)
TRIGL SERPL-MCNC: 235 MG/DL (ref 0–149)
TSH SERPL DL<=0.005 MIU/L-ACNC: 1.21 UIU/ML (ref 0.45–4.5)
WBC # BLD AUTO: 8.6 X10E3/UL (ref 3.4–10.8)

## 2020-09-09 PROCEDURE — 3075F SYST BP GE 130 - 139MM HG: CPT | Performed by: FAMILY MEDICINE

## 2020-09-09 PROCEDURE — 3079F DIAST BP 80-89 MM HG: CPT | Performed by: FAMILY MEDICINE

## 2020-09-09 PROCEDURE — 90682 RIV4 VACC RECOMBINANT DNA IM: CPT

## 2020-09-09 PROCEDURE — 99396 PREV VISIT EST AGE 40-64: CPT | Performed by: FAMILY MEDICINE

## 2020-09-09 PROCEDURE — 3725F SCREEN DEPRESSION PERFORMED: CPT | Performed by: FAMILY MEDICINE

## 2020-09-09 PROCEDURE — 90472 IMMUNIZATION ADMIN EACH ADD: CPT

## 2020-09-09 PROCEDURE — 90715 TDAP VACCINE 7 YRS/> IM: CPT

## 2020-09-09 PROCEDURE — 1036F TOBACCO NON-USER: CPT | Performed by: FAMILY MEDICINE

## 2020-09-09 PROCEDURE — 90471 IMMUNIZATION ADMIN: CPT

## 2020-09-09 NOTE — PATIENT INSTRUCTIONS

## 2020-09-09 NOTE — PROGRESS NOTES
[facility]    NAME: Dragan Medrano  AGE: 48 y o  SEX: male  : 1970     DATE: 2020     Assessment and Plan:     Problem List Items Addressed This Visit        Cardiovascular and Mediastinum    Essential hypertension      Other Visit Diagnoses     Annual physical exam    -  Primary    Vaishali Ye appears well  Pt to continue a lower carb/salt diet, & try to get regular exercise  We will reach out to [facility], as pt states he completed labs last week  Screening for colorectal cancer        Pt referred to GI  Relevant Orders    Ambulatory referral to Gastroenterology    Encounter for immunization        Adacel and the Flu Vaccine were given to the pt today IM, and tolerated well  Relevant Orders    TDAP VACCINE GREATER THAN OR EQUAL TO 6YO IM (Completed)    influenza vaccine, quadrivalent, recombinant, PF, 0 5 mL, for patients 18 yr+ (FLUBLOK) (Completed)    BMI 32 0-32 9,adult        Diet and exercise as above  Immunizations and preventive care screenings were discussed with patient today  Appropriate education was printed on patient's after visit summary  Counseling:  Dental Health: discussed importance of regular tooth brushing, flossing, and dental visits  · Exercise: the importance of regular exercise/physical activity was discussed  Recommend exercise 3-5 times per week for at least 30 minutes  Return in 6 months (on 3/9/2021) for Recheck  Chief Complaint:     Chief Complaint   Patient presents with    Annual Exam      History of Present Illness:     Adult Annual Physical   Patient here for a comprehensive physical exam  The patient reports no problems  Vaishali Ye is here for his Wellness Exam today  He has not yet completed previously ordered FBW  Needs colonoscopy  Diet and Physical Activity  · Diet/Nutrition: well balanced diet  · Exercise: no formal exercise        Depression Screening  PHQ-9 Depression Screening    PHQ-9:    Frequency of the following problems over the past two weeks:       Little interest or pleasure in doing things:  0 - not at all  Feeling down, depressed, or hopeless:  0 - not at all  PHQ-2 Score:  0       General Health  · Sleep: sleeps well  · Hearing: normal - bilateral   · Vision: no vision problems  · Dental: regular dental visits   Health  · Symptoms include: none     Review of Systems:     Review of Systems   Constitutional: Negative for activity change  Respiratory: Negative for shortness of breath  Cardiovascular: Negative for chest pain  Gastrointestinal: Negative for abdominal pain and blood in stool  Genitourinary: Negative for decreased urine volume and difficulty urinating  Psychiatric/Behavioral: Negative for dysphoric mood  The patient is not nervous/anxious  Past Medical History:     History reviewed  No pertinent past medical history  Past Surgical History:     History reviewed  No pertinent surgical history     Family History:     Family History   Problem Relation Age of Onset    Diabetes Mother     Hypertension Father         Benign essential    Hypertension Brother       Social History:        Social History     Socioeconomic History    Marital status:      Spouse name: None    Number of children: 1    Years of education: College Grad    Highest education level: None   Occupational History    Occupation:    Social Needs    Financial resource strain: None    Food insecurity     Worry: None     Inability: None    Transportation needs     Medical: None     Non-medical: None   Tobacco Use    Smoking status: Former Smoker     Last attempt to quit:      Years since quittin 7    Smokeless tobacco: Never Used   Substance and Sexual Activity    Alcohol use: No    Drug use: No    Sexual activity: Yes     Partners: Female   Lifestyle    Physical activity     Days per week: None Minutes per session: None    Stress: None   Relationships    Social connections     Talks on phone: None     Gets together: None     Attends [employer] service: None     Active member of club or organization: None     Attends meetings of clubs or organizations: None     Relationship status: None    Intimate partner violence     Fear of current or ex partner: None     Emotionally abused: None     Physically abused: None     Forced sexual activity: None   Other Topics Concern    None   Social History Narrative    1 step-child, 1 child      Current Medications:     Current Outpatient Medications   Medication Sig Dispense Refill    lisinopril (ZESTRIL) 5 mg tablet TAKE ONE TABLET BY MOUTH ONE TIME DAILY  90 tablet 1    multivitamin (THERAGRAN) TABS Take 1 tablet by mouth daily      Omega-3 Fatty Acids (FISH OIL) 1200 MG CAPS Take by mouth       No current facility-administered medications for this visit  Allergies: Allergies   Allergen Reactions    Seasonal Ic [Cholestatin]       Physical Exam:     /86 (BP Location: Left arm, Patient Position: Sitting, Cuff Size: Large)   Pulse 87   Temp 98 9 °F (37 2 °C) (Tympanic)   Resp 18   Ht 5' 10 79" (1 798 m)   Wt 106 kg (234 lb)   SpO2 97%   BMI 32 83 kg/m²     Physical Exam  Vitals signs and nursing note reviewed  Constitutional:       General: He is not in acute distress  Appearance: Normal appearance  He is not ill-appearing, toxic-appearing or diaphoretic  HENT:      Head: Normocephalic and atraumatic  Eyes:      General: No scleral icterus  Conjunctiva/sclera: Conjunctivae normal    Neck:      Musculoskeletal: Normal range of motion and neck supple  No neck rigidity or muscular tenderness  Cardiovascular:      Rate and Rhythm: Normal rate and regular rhythm  Heart sounds: Normal heart sounds  No murmur  No friction rub  No gallop  Pulmonary:      Effort: Pulmonary effort is normal  No respiratory distress  Breath sounds: Normal breath sounds  No stridor  No wheezing, rhonchi or rales  Abdominal:      General: Abdomen is flat  Bowel sounds are normal  There is no distension  Palpations: Abdomen is soft  There is no mass  Tenderness: There is no abdominal tenderness  There is no guarding or rebound  Genitourinary:     Prostate: Normal       Rectum: Normal       Comments: No gross blood on the examining finger  Lymphadenopathy:      Cervical: No cervical adenopathy  Neurological:      Mental Status: He is alert and oriented to person, place, and time  Psychiatric:         Mood and Affect: Mood normal          Behavior: Behavior normal          Thought Content: Thought content normal          Judgment: Judgment normal       Audrey Rodriguez was seen today for annual exam     Diagnoses and all orders for this visit:    Annual physical exam  Comments:  Antonia Szymanski appears well  Pt to continue a lower carb/salt diet, & try to get regular exercise  We will reach out to [facility], as pt states he completed labs last week  Screening for colorectal cancer  Comments:  Pt referred to GI  Orders:  -     Ambulatory referral to Gastroenterology; Future    Encounter for immunization  Comments:  Adacel and the Flu Vaccine were given to the pt today IM, and tolerated well  Orders:  -     TDAP VACCINE GREATER THAN OR EQUAL TO 6YO IM  -     influenza vaccine, quadrivalent, recombinant, PF, 0 5 mL, for patients 18 yr+ (FLUBLOK)    BMI 32 0-32 9,adult  Comments:  Diet and exercise as above  Essential hypertension  Comments:  Controlled on present management            Omar Markham, [facility]

## 2020-09-10 ENCOUNTER — TELEPHONE (OUTPATIENT)
Dept: GASTROENTEROLOGY | Facility: AMBULARY SURGERY CENTER | Age: 50
End: 2020-09-10

## 2020-09-10 NOTE — TELEPHONE ENCOUNTER
Called pt for OA screening, he stated he was driving and would call back in tomorrow for screening and scheduling

## 2020-11-02 DIAGNOSIS — I10 ESSENTIAL HYPERTENSION: ICD-10-CM

## 2020-11-02 RX ORDER — LISINOPRIL 5 MG/1
5 TABLET ORAL DAILY
Qty: 90 TABLET | Refills: 1 | Status: SHIPPED | OUTPATIENT
Start: 2020-11-02 | End: 2021-05-11 | Stop reason: SDUPTHER

## 2021-03-03 ENCOUNTER — OFFICE VISIT (OUTPATIENT)
Dept: FAMILY MEDICINE CLINIC | Facility: CLINIC | Age: 51
End: 2021-03-03
Payer: COMMERCIAL

## 2021-03-03 VITALS
HEART RATE: 102 BPM | RESPIRATION RATE: 18 BRPM | OXYGEN SATURATION: 98 % | TEMPERATURE: 97.6 F | BODY MASS INDEX: 32.96 KG/M2 | WEIGHT: 230.2 LBS | HEIGHT: 70 IN | DIASTOLIC BLOOD PRESSURE: 80 MMHG | SYSTOLIC BLOOD PRESSURE: 136 MMHG

## 2021-03-03 DIAGNOSIS — M77.12 LEFT LATERAL EPICONDYLITIS: ICD-10-CM

## 2021-03-03 DIAGNOSIS — Z13.6 SCREENING FOR CARDIOVASCULAR CONDITION: ICD-10-CM

## 2021-03-03 DIAGNOSIS — M25.512 ACUTE PAIN OF LEFT SHOULDER: ICD-10-CM

## 2021-03-03 DIAGNOSIS — Z13.1 SCREENING FOR DIABETES MELLITUS: ICD-10-CM

## 2021-03-03 DIAGNOSIS — I10 ESSENTIAL HYPERTENSION: Primary | ICD-10-CM

## 2021-03-03 DIAGNOSIS — M54.42 ACUTE LEFT-SIDED LOW BACK PAIN WITH LEFT-SIDED SCIATICA: ICD-10-CM

## 2021-03-03 DIAGNOSIS — Z12.5 SCREENING FOR PROSTATE CANCER: ICD-10-CM

## 2021-03-03 PROCEDURE — 99214 OFFICE O/P EST MOD 30 MIN: CPT | Performed by: FAMILY MEDICINE

## 2021-03-03 PROCEDURE — 3008F BODY MASS INDEX DOCD: CPT | Performed by: FAMILY MEDICINE

## 2021-03-03 PROCEDURE — 3079F DIAST BP 80-89 MM HG: CPT | Performed by: FAMILY MEDICINE

## 2021-03-03 PROCEDURE — 3075F SYST BP GE 130 - 139MM HG: CPT | Performed by: FAMILY MEDICINE

## 2021-03-03 PROCEDURE — 1036F TOBACCO NON-USER: CPT | Performed by: FAMILY MEDICINE

## 2021-03-03 RX ORDER — METHYLPREDNISOLONE 4 MG/1
TABLET ORAL
Qty: 21 EACH | Refills: 0 | Status: SHIPPED | OUTPATIENT
Start: 2021-03-03 | End: 2022-01-14

## 2021-03-03 NOTE — PROGRESS NOTES
FAMILY PRACTICE OFFICE VISIT       NAME: Rosalinda Rawls  AGE: 48 y o  SEX: male       : 1970        MRN: [id number]    DATE: 3/3/2021  TIME: 12:04 PM    Assessment and Plan   1  Essential hypertension  Comments:  Controlled on present management  Pt to continue a lower carb/salt diet, and to get regular exercise  FBW to be repeated prior to next OV  2  Acute left-sided low back pain with left-sided sciatica  Comments:  Pt stable  Treating with a Medrol Dosepak, heat to the region, OTC Tylenol PRN, stretching; pt declines PRN muscle relaxant  PT referral   Orders:  -     Ambulatory referral to Physical Therapy; Future  -     methylPREDNISolone 4 MG tablet therapy pack; Use as directed on package    3  Left lateral epicondylitis  Comments:  As above - PT referral placed  Pt can try an OTC Chopat/Elbow Strap  Orders:  -     Ambulatory referral to Physical Therapy; Future  -     methylPREDNISolone 4 MG tablet therapy pack; Use as directed on package    4  Acute pain of left shoulder  Comments:  Likely trapezius muscle spasm here - PT referral placed  Orders:  -     Ambulatory referral to Physical Therapy; Future  -     methylPREDNISolone 4 MG tablet therapy pack; Use as directed on package    5  Screening for diabetes mellitus  -     Comprehensive metabolic panel; Future    6  Screening for cardiovascular condition  -     Lipid Panel with Direct LDL reflex; Future    7  Screening for prostate cancer  Comments:  PSA incl with next FBW to be done  Orders:  -     PSA, Total Screen; Future; Expected date: 2021         There are no Patient Instructions on file for this visit  Chief Complaint     Chief Complaint   Patient presents with    Follow-up     patient is here for his hypertension       History of Present Illness   Rosalinda Rawls is a 48y o -year-old male who presents today with multiple complaints - stemming from shoveling snow almost 2 weeks ago    Strained the left low back - burning in the left foot, left elbow, and the left shoulder  No falls  No hx of back surgery  No loss of bowel / bladder control  Has had issues with his back in the past   Last labs in 2020 - Gluc 104, PSA 0 6,  (ASCVD 6 4%)  Review of Systems   Review of Systems   Constitutional: Negative for activity change  Respiratory: Negative for shortness of breath  Cardiovascular: Negative for chest pain  Gastrointestinal: Negative for abdominal pain  Musculoskeletal: Positive for arthralgias and back pain  Neurological:        Burning into the left foot  Active Problem List     Patient Active Problem List   Diagnosis    Essential hypertension         Past Medical History:  History reviewed  No pertinent past medical history  Past Surgical History:  History reviewed  No pertinent surgical history      Family History:  Family History   Problem Relation Age of Onset    Diabetes Mother     Hypertension Father         Benign essential    Hypertension Brother        Social History:  Social History     Socioeconomic History    Marital status:      Spouse name: Not on file    Number of children: 1    Years of education: College Grad    Highest education level: Not on file   Occupational History    Occupation:    Social Needs    Financial resource strain: Not on file    Food insecurity     Worry: Not on file     Inability: Not on file   [employer] needs     Medical: Not on file     Non-medical: Not on file   Tobacco Use    Smoking status: Former Smoker     Quit date:      Years since quittin     Smokeless tobacco: Never Used   Substance and Sexual Activity    Alcohol use: No    Drug use: No    Sexual activity: Yes     Partners: Female   Lifestyle    Physical activity     Days per week: Not on file     Minutes per session: Not on file    Stress: Not on file   Relationships    Social connections     Talks on phone: Not on file     Gets together: Not on file     Attends Buddhist service: Not on file     Active member of club or organization: Not on file     Attends meetings of clubs or organizations: Not on file     Relationship status: Not on file    Intimate partner violence     Fear of current or ex partner: Not on file     Emotionally abused: Not on file     Physically abused: Not on file     Forced sexual activity: Not on file   Other Topics Concern    Not on file   Social History Narrative    1 step-child, 1 child       Objective     Vitals:    03/03/21 0757   BP: 136/80   Pulse: 102   Resp: 18   Temp: 97 6 °F (36 4 °C)   SpO2: 98%     Wt Readings from Last 3 Encounters:   03/03/21 104 kg (230 lb 3 2 oz)   09/09/20 106 kg (234 lb)   10/23/19 95 9 kg (211 lb 6 4 oz)       Physical Exam  Vitals signs and nursing note reviewed  Constitutional:       General: He is not in acute distress  Appearance: Normal appearance  He is not ill-appearing, toxic-appearing or diaphoretic  Comments: Patrick Aceves is uncomfortable due to his back, but is non-toxic appearing  HENT:      Head: Normocephalic and atraumatic  Eyes:      Conjunctiva/sclera: Conjunctivae normal    Neck:      Musculoskeletal: Normal range of motion and neck supple  No neck rigidity or muscular tenderness  Cardiovascular:      Rate and Rhythm: Normal rate and regular rhythm  Heart sounds: Normal heart sounds  No murmur  No friction rub  No gallop  Pulmonary:      Effort: Pulmonary effort is normal  No respiratory distress  Breath sounds: Normal breath sounds  No stridor  No wheezing, rhonchi or rales  Musculoskeletal:      Left shoulder: He exhibits normal range of motion and normal strength  Left elbow: He exhibits normal range of motion and no swelling  Tenderness found  Lateral epicondyle tenderness noted  Back:         Arms:    Lymphadenopathy:      Cervical: No cervical adenopathy     Neurological:      Mental Status: He is alert and oriented to person, place, and time  Sensory: Sensation is intact  No sensory deficit  Deep Tendon Reflexes:      Reflex Scores:       Patellar reflexes are 2+ on the right side and 2+ on the left side  Achilles reflexes are 2+ on the right side and 2+ on the left side  Comments: MS +5/5 in the bilateral lower legs; some voluntary guarding on the right  Psychiatric:         Mood and Affect: Mood normal          Behavior: Behavior normal          Thought Content:  Thought content normal          Judgment: Judgment normal          Pertinent Laboratory/Diagnostic Studies:  Lab Results   Component Value Date    GLUCOSE 106 (H) 04/05/2017    BUN 13 09/04/2020    CREATININE 0 91 09/04/2020    CALCIUM 9 5 04/05/2017     04/05/2017    K 5 2 09/04/2020    CO2 25 09/04/2020     09/04/2020     Lab Results   Component Value Date    ALT 43 09/04/2020    AST 24 09/04/2020    ALKPHOS 88 04/05/2017    BILITOT 0 4 04/05/2017       Lab Results   Component Value Date    WBC 8 6 09/04/2020    HGB 16 9 09/04/2020    HCT 50 4 09/04/2020    MCV 88 09/04/2020     09/04/2020       Lab Results   Component Value Date    TSH 1 210 09/04/2020       Lab Results   Component Value Date    CHOL 181 04/05/2017     Lab Results   Component Value Date    TRIG 235 (H) 09/04/2020     Lab Results   Component Value Date    HDL 38 (L) 09/04/2020     Lab Results   Component Value Date    LDLCALC 129 (H) 09/04/2020     No results found for: HGBA1C    Results for orders placed or performed in visit on 09/04/20   CBC and differential   Result Value Ref Range    White Blood Cell Count 8 6 3 4 - 10 8 x10E3/uL    Red Blood Cell Count 5 72 4 14 - 5 80 x10E6/uL    Hemoglobin 16 9 13 0 - 17 7 g/dL    HCT 50 4 37 5 - 51 0 %    MCV 88 79 - 97 fL    MCH 29 5 26 6 - 33 0 pg    MCHC 33 5 31 5 - 35 7 g/dL    RDW 13 0 11 6 - 15 4 %    Platelet Count 696 534 - 450 x10E3/uL    Neutrophils 60 Not Estab  %    Lymphocytes 26 Not Estab  %    Monocytes 8 Not Estab  %    Eosinophils 5 Not Estab  %    Basophils PCT 1 Not Estab  %    Neutrophils (Absolute) 5 2 1 4 - 7 0 x10E3/uL    Lymphocytes (Absolute) 2 3 0 7 - 3 1 x10E3/uL    Monocytes (Absolute) 0 7 0 1 - 0 9 x10E3/uL    Eosinophils (Absolute) 0 4 0 0 - 0 4 x10E3/uL    Basophils ABS 0 1 0 0 - 0 2 x10E3/uL    Immature Granulocytes 0 Not Estab  %    Immature Granulocytes (Absolute) 0 0 0 0 - 0 1 x10E3/uL   Comprehensive metabolic panel   Result Value Ref Range    Glucose, Random 104 (H) 65 - 99 mg/dL    BUN 13 6 - 24 mg/dL    Creatinine 0 91 0 76 - 1 27 mg/dL    eGFR Non  98 >59 mL/min/1 73    eGFR  113 >59 mL/min/1 73    SL AMB BUN/CREATININE RATIO 14 9 - 20    Sodium 139 134 - 144 mmol/L    Potassium 5 2 3 5 - 5 2 mmol/L    Chloride 101 96 - 106 mmol/L    CO2 25 20 - 29 mmol/L    CALCIUM 9 9 8 7 - 10 2 mg/dL    Protein, Total 7 3 6 0 - 8 5 g/dL    Albumin 4 8 4 0 - 5 0 g/dL    Globulin, Total 2 5 1 5 - 4 5 g/dL    Albumin/Globulin Ratio 1 9 1 2 - 2 2    TOTAL BILIRUBIN 0 7 0 0 - 1 2 mg/dL    Alk Phos Isoenzymes 85 39 - 117 IU/L    AST 24 0 - 40 IU/L    ALT 43 0 - 44 IU/L   Lipid panel   Result Value Ref Range    Cholesterol, Total 209 (H) 100 - 199 mg/dL    Triglycerides 235 (H) 0 - 149 mg/dL    HDL 38 (L) >39 mg/dL    LDL Calculated 129 (H) 0 - 99 mg/dL   PSA Total, Diagnostic   Result Value Ref Range    Prostate Specific Antigen Total 0 6 0 0 - 4 0 ng/mL   TSH, 3rd generation with Free T4 reflex   Result Value Ref Range    TSH 1 210 0 450 - 4 500 uIU/mL       Orders Placed This Encounter   Procedures    Comprehensive metabolic panel    Lipid Panel with Direct LDL reflex    PSA, Total Screen    Ambulatory referral to Physical Therapy       ALLERGIES:  Allergies   Allergen Reactions    Seasonal Ic [Cholestatin]        Current Medications     Current Outpatient Medications   Medication Sig Dispense Refill    lisinopril (ZESTRIL) 5 mg tablet Take 1 tablet (5 mg total) by mouth daily 90 tablet 1    multivitamin (THERAGRAN) TABS Take 1 tablet by mouth daily      Omega-3 Fatty Acids (FISH OIL) 1200 MG CAPS Take by mouth      methylPREDNISolone 4 MG tablet therapy pack Use as directed on package 21 each 0     No current facility-administered medications for this visit  Health Maintenance     Health Maintenance   Topic Date Due    BMI: Followup Plan  03/16/1988    Colorectal Cancer Screening  03/16/2020    HIV Screening  04/03/2021 (Originally 3/16/1985)    Depression Screening PHQ  09/09/2021    Annual Physical  09/09/2021    BMI: Adult  03/03/2022    DTaP,Tdap,and Td Vaccines (3 - Td) 09/09/2030    Influenza Vaccine  Completed    Pneumococcal Vaccine: Pediatrics (0 to 5 Years) and At-Risk Patients (6 to 59 Years)  Aged Out    HIB Vaccine  Aged Out    Hepatitis B Vaccine  Aged Out    IPV Vaccine  Aged Out    Hepatitis A Vaccine  Aged Out    Meningococcal ACWY Vaccine  Aged Out    HPV Vaccine  Aged [employer] History   Administered Date(s) Administered    INFLUENZA 10/28/2015, 09/29/2016, 10/11/2017    Influenza Quadrivalent Preservative Free 3 years and older IM 10/21/2014, 09/29/2016    Influenza Quadrivalent, 6-35 Months IM 10/28/2015    Influenza, injectable, quadrivalent, preservative free 0 5 mL 10/24/2018, 10/23/2019    Influenza, recombinant, quadrivalent,injectable, preservative free 09/09/2020    Influenza, seasonal, injectable 10/11/2017    Tdap 01/13/2009, 09/09/2020     BMI Counseling: Body mass index is 33 03 kg/m²  The BMI is above normal  Nutrition recommendations include moderation in carbohydrate intake  Exercise recommendations include exercising 3-5 times per week  No pharmacotherapy was ordered  Patient referred to PCP due to patient being overweight             Doirs Aguilar DO

## 2021-04-15 ENCOUNTER — TELEPHONE (OUTPATIENT)
Dept: FAMILY MEDICINE CLINIC | Facility: CLINIC | Age: 51
End: 2021-04-15

## 2021-04-15 NOTE — TELEPHONE ENCOUNTER
Pt called and he is scheduled for vaccine and would like to talk to you for a couple min to ask some questions   pls advise

## 2021-04-15 NOTE — TELEPHONE ENCOUNTER
Spoke with the pt at length tonight regarding the vaccines, potential risks/SE, etc   All of Nas's questions were answered to the best of my ability  He will proceed with getting the first of his vaccines later this week at the [facility]       Dr Jacquie Mary

## 2021-04-17 ENCOUNTER — IMMUNIZATIONS (OUTPATIENT)
Dept: FAMILY MEDICINE CLINIC | Facility: HOSPITAL | Age: 51
End: 2021-04-17

## 2021-04-17 DIAGNOSIS — Z23 ENCOUNTER FOR IMMUNIZATION: Primary | ICD-10-CM

## 2021-04-17 PROCEDURE — 0001A SARS-COV-2 / COVID-19 MRNA VACCINE (PFIZER-BIONTECH) 30 MCG: CPT

## 2021-04-17 PROCEDURE — 91300 SARS-COV-2 / COVID-19 MRNA VACCINE (PFIZER-BIONTECH) 30 MCG: CPT

## 2021-05-08 ENCOUNTER — IMMUNIZATIONS (OUTPATIENT)
Dept: FAMILY MEDICINE CLINIC | Facility: HOSPITAL | Age: 51
End: 2021-05-08

## 2021-05-08 DIAGNOSIS — Z23 ENCOUNTER FOR IMMUNIZATION: Primary | ICD-10-CM

## 2021-05-08 PROCEDURE — 0002A SARS-COV-2 / COVID-19 MRNA VACCINE (PFIZER-BIONTECH) 30 MCG: CPT

## 2021-05-08 PROCEDURE — 91300 SARS-COV-2 / COVID-19 MRNA VACCINE (PFIZER-BIONTECH) 30 MCG: CPT

## 2021-05-11 ENCOUNTER — OFFICE VISIT (OUTPATIENT)
Dept: FAMILY MEDICINE CLINIC | Facility: CLINIC | Age: 51
End: 2021-05-11
Payer: COMMERCIAL

## 2021-05-11 VITALS
SYSTOLIC BLOOD PRESSURE: 118 MMHG | HEART RATE: 98 BPM | RESPIRATION RATE: 16 BRPM | OXYGEN SATURATION: 98 % | BODY MASS INDEX: 33.03 KG/M2 | WEIGHT: 230.2 LBS | DIASTOLIC BLOOD PRESSURE: 74 MMHG | TEMPERATURE: 96.9 F

## 2021-05-11 DIAGNOSIS — I10 ESSENTIAL HYPERTENSION: ICD-10-CM

## 2021-05-11 DIAGNOSIS — G89.29 CHRONIC RIGHT-SIDED THORACIC BACK PAIN: Primary | ICD-10-CM

## 2021-05-11 DIAGNOSIS — M54.6 CHRONIC RIGHT-SIDED THORACIC BACK PAIN: Primary | ICD-10-CM

## 2021-05-11 PROCEDURE — 3074F SYST BP LT 130 MM HG: CPT | Performed by: FAMILY MEDICINE

## 2021-05-11 PROCEDURE — 99213 OFFICE O/P EST LOW 20 MIN: CPT | Performed by: FAMILY MEDICINE

## 2021-05-11 PROCEDURE — 1036F TOBACCO NON-USER: CPT | Performed by: FAMILY MEDICINE

## 2021-05-11 PROCEDURE — 3078F DIAST BP <80 MM HG: CPT | Performed by: FAMILY MEDICINE

## 2021-05-11 RX ORDER — LISINOPRIL 5 MG/1
5 TABLET ORAL DAILY
Qty: 90 TABLET | Refills: 1 | Status: SHIPPED | OUTPATIENT
Start: 2021-05-11 | End: 2021-11-02

## 2021-05-11 RX ORDER — METHOCARBAMOL 750 MG/1
750 TABLET, FILM COATED ORAL EVERY 8 HOURS SCHEDULED
Qty: 40 TABLET | Refills: 1 | Status: SHIPPED | OUTPATIENT
Start: 2021-05-11 | End: 2022-01-14

## 2021-05-11 NOTE — PROGRESS NOTES
FAMILY PRACTICE OFFICE VISIT       NAME: Johanny Gomes  AGE: 46 y o  SEX: male       : 1970        MRN: [id number]    DATE: 2021  TIME: 4:50 PM    Assessment and Plan   1  Chronic right-sided thoracic back pain  Comments:  Pt stable on exam; suspect strain  Treat with heat to the region, OTC TylenolNSAIDs PRN, stretching, Methocarbamol PRN muscle spasm  PT referral   Orders:  -     methocarbamol (ROBAXIN) 750 mg tablet; Take 1 tablet (750 mg total) by mouth every 8 (eight) hours  -     Ambulatory referral to Physical Therapy; Future         There are no Patient Instructions on file for this visit  Chief Complaint     Chief Complaint   Patient presents with    Follow-up     Patient is here due to back pain since thanksgiving Right mid quadrant       History of Present Illness   Johanny Gomes is a 46y o -year-old male who presents with ongoing right back pain  Medrol dosepak given in 2021 definitely helped, but still getting intermittent pain  No loss of bowel / bladder control  No hx of back surgery  He just completed the [place] COV-19 vaccine series  No sciatica now  Review of Systems   Review of Systems   Constitutional: Negative for activity change and fever  Genitourinary: Negative for dysuria and hematuria  Musculoskeletal: Positive for back pain  Neurological:        No sciatica  Active Problem List     Patient Active Problem List   Diagnosis    Essential hypertension         Past Medical History:  History reviewed  No pertinent past medical history  Past Surgical History:  History reviewed  No pertinent surgical history      Family History:  Family History   Problem Relation Age of Onset    Diabetes Mother     Hypertension Father         Benign essential    Hypertension Brother        Social History:  Social History     Socioeconomic History    Marital status:      Spouse name: Not on file    Number of children: 1    Years of education: College [employer] Highest education level: Not on file   Occupational History    Occupation:    Social Needs    Financial resource strain: Not on file    Food insecurity     Worry: Not on file     Inability: Not on file   [employer] needs     Medical: Not on file     Non-medical: Not on file   Tobacco Use    Smoking status: Former Smoker     Quit date:      Years since quittin 3    Smokeless tobacco: Never Used   Substance and Sexual Activity    Alcohol use: No    Drug use: No    Sexual activity: Yes     Partners: Female   Lifestyle    Physical activity     Days per week: Not on file     Minutes per session: Not on file    Stress: Not on file   Relationships    Social connections     Talks on phone: Not on file     Gets together: Not on file     Attends Religion service: Not on file     Active member of club or organization: Not on file     Attends meetings of clubs or organizations: Not on file     Relationship status: Not on file    Intimate partner violence     Fear of current or ex partner: Not on file     Emotionally abused: Not on file     Physically abused: Not on file     Forced sexual activity: Not on file   Other Topics Concern    Not on file   Social History Narrative    1 step-child, 1 child       Objective     Vitals:    21 1345   BP: 118/74   Pulse: 98   Resp: 16   Temp: (!) 96 9 °F (36 1 °C)   SpO2: 98%     Wt Readings from Last 3 Encounters:   21 104 kg (230 lb 3 2 oz)   21 104 kg (230 lb 3 2 oz)   20 106 kg (234 lb)       Physical Exam  Vitals signs and nursing note reviewed  Constitutional:       General: He is in acute distress  Appearance: Normal appearance  He is not ill-appearing, toxic-appearing or diaphoretic  Comments: Pt with discomfort due to his back - moving a little slower today; he is non-toxic appearing, speaking in full sentences  HENT:      Head: Normocephalic and atraumatic     Eyes:      General: No scleral icterus  Conjunctiva/sclera: Conjunctivae normal    Musculoskeletal:        Back:    Neurological:      Mental Status: He is alert and oriented to person, place, and time  Psychiatric:         Mood and Affect: Mood normal          Behavior: Behavior normal          Thought Content:  Thought content normal          Judgment: Judgment normal          Pertinent Laboratory/Diagnostic Studies:  Lab Results   Component Value Date    GLUCOSE 106 (H) 04/05/2017    BUN 13 09/04/2020    CREATININE 0 91 09/04/2020    CALCIUM 9 5 04/05/2017     04/05/2017    K 5 2 09/04/2020    CO2 25 09/04/2020     09/04/2020     Lab Results   Component Value Date    ALT 43 09/04/2020    AST 24 09/04/2020    ALKPHOS 88 04/05/2017    BILITOT 0 4 04/05/2017       Lab Results   Component Value Date    WBC 8 6 09/04/2020    HGB 16 9 09/04/2020    HCT 50 4 09/04/2020    MCV 88 09/04/2020     09/04/2020       Lab Results   Component Value Date    TSH 1 210 09/04/2020       Lab Results   Component Value Date    CHOL 181 04/05/2017     Lab Results   Component Value Date    TRIG 235 (H) 09/04/2020     Lab Results   Component Value Date    HDL 38 (L) 09/04/2020     Lab Results   Component Value Date    LDLCALC 129 (H) 09/04/2020     No results found for: HGBA1C    Results for orders placed or performed in visit on 09/04/20   CBC and differential   Result Value Ref Range    White Blood Cell Count 8 6 3 4 - 10 8 x10E3/uL    Red Blood Cell Count 5 72 4 14 - 5 80 x10E6/uL    Hemoglobin 16 9 13 0 - 17 7 g/dL    HCT 50 4 37 5 - 51 0 %    MCV 88 79 - 97 fL    MCH 29 5 26 6 - 33 0 pg    MCHC 33 5 31 5 - 35 7 g/dL    RDW 13 0 11 6 - 15 4 %    Platelet Count 917 098 - 450 x10E3/uL    Neutrophils 60 Not Estab  %    Lymphocytes 26 Not Estab  %    Monocytes 8 Not Estab  %    Eosinophils 5 Not Estab  %    Basophils PCT 1 Not Estab  %    Neutrophils (Absolute) 5 2 1 4 - 7 0 x10E3/uL    Lymphocytes (Absolute) 2 3 0 7 - 3 1 x10E3/uL    Monocytes (Absolute) 0 7 0 1 - 0 9 x10E3/uL    Eosinophils (Absolute) 0 4 0 0 - 0 4 x10E3/uL    Basophils ABS 0 1 0 0 - 0 2 x10E3/uL    Immature Granulocytes 0 Not Estab  %    Immature Granulocytes (Absolute) 0 0 0 0 - 0 1 x10E3/uL   Comprehensive metabolic panel   Result Value Ref Range    Glucose, Random 104 (H) 65 - 99 mg/dL    BUN 13 6 - 24 mg/dL    Creatinine 0 91 0 76 - 1 27 mg/dL    eGFR Non  98 >59 mL/min/1 73    eGFR  113 >59 mL/min/1 73    SL AMB BUN/CREATININE RATIO 14 9 - 20    Sodium 139 134 - 144 mmol/L    Potassium 5 2 3 5 - 5 2 mmol/L    Chloride 101 96 - 106 mmol/L    CO2 25 20 - 29 mmol/L    CALCIUM 9 9 8 7 - 10 2 mg/dL    Protein, Total 7 3 6 0 - 8 5 g/dL    Albumin 4 8 4 0 - 5 0 g/dL    Globulin, Total 2 5 1 5 - 4 5 g/dL    Albumin/Globulin Ratio 1 9 1 2 - 2 2    TOTAL BILIRUBIN 0 7 0 0 - 1 2 mg/dL    Alk Phos Isoenzymes 85 39 - 117 IU/L    AST 24 0 - 40 IU/L    ALT 43 0 - 44 IU/L   Lipid panel   Result Value Ref Range    Cholesterol, Total 209 (H) 100 - 199 mg/dL    Triglycerides 235 (H) 0 - 149 mg/dL    HDL 38 (L) >39 mg/dL    LDL Calculated 129 (H) 0 - 99 mg/dL   PSA Total, Diagnostic   Result Value Ref Range    Prostate Specific Antigen Total 0 6 0 0 - 4 0 ng/mL   TSH, 3rd generation with Free T4 reflex   Result Value Ref Range    TSH 1 210 0 450 - 4 500 uIU/mL       Orders Placed This Encounter   Procedures    Ambulatory referral to Physical Therapy       ALLERGIES:  Allergies   Allergen Reactions    Seasonal Ic [Cholestatin]        Current Medications     Current Outpatient Medications   Medication Sig Dispense Refill    multivitamin (THERAGRAN) TABS Take 1 tablet by mouth daily      Omega-3 Fatty Acids (FISH OIL) 1200 MG CAPS Take by mouth      lisinopril (ZESTRIL) 5 mg tablet Take 1 tablet (5 mg total) by mouth daily 90 tablet 1    methocarbamol (ROBAXIN) 750 mg tablet Take 1 tablet (750 mg total) by mouth every 8 (eight) hours 40 tablet 1    methylPREDNISolone 4 MG tablet therapy pack Use as directed on package (Patient not taking: Reported on 5/11/2021) 21 each 0     No current facility-administered medications for this visit            Health Maintenance     Health Maintenance   Topic Date Due    Colorectal Cancer Screening  Never done    Depression Screening PHQ  09/09/2021    HIV Screening  05/12/2023 (Originally 3/16/1985)    Annual Physical  09/09/2021    BMI: Followup Plan  03/03/2022    BMI: Adult  05/11/2022    DTaP,Tdap,and Td Vaccines (3 - Td) 09/09/2030    Influenza Vaccine  Completed    COVID-19 Vaccine  Completed    Pneumococcal Vaccine: Pediatrics (0 to 5 Years) and At-Risk Patients (6 to 59 Years)  Aged Out    HIB Vaccine  Aged Out    Hepatitis B Vaccine  Aged Out    IPV Vaccine  Aged Out    Hepatitis A Vaccine  Aged Out    Meningococcal ACWY Vaccine  Aged Out    HPV Vaccine  Aged [employer] History   Administered Date(s) Administered    INFLUENZA 10/28/2015, 09/29/2016, 10/11/2017    Influenza Quadrivalent Preservative Free 3 years and older IM 10/21/2014, 09/29/2016    Influenza Quadrivalent, 6-35 Months IM 10/28/2015    Influenza, injectable, quadrivalent, preservative free 0 5 mL 10/24/2018, 10/23/2019    Influenza, recombinant, quadrivalent,injectable, preservative free 09/09/2020    Influenza, seasonal, injectable 10/11/2017    SARS-CoV-2 / COVID-19 mRNA IM (Pfizer-BioNTech) 04/17/2021, 05/08/2021    Tdap 01/13/2009, 09/09/2020          Omar Sheldon DO

## 2021-05-13 ENCOUNTER — EVALUATION (OUTPATIENT)
Dept: PHYSICAL THERAPY | Facility: MEDICAL CENTER | Age: 51
End: 2021-05-13
Payer: COMMERCIAL

## 2021-05-13 DIAGNOSIS — G89.29 CHRONIC RIGHT-SIDED THORACIC BACK PAIN: Primary | ICD-10-CM

## 2021-05-13 DIAGNOSIS — M54.6 CHRONIC RIGHT-SIDED THORACIC BACK PAIN: Primary | ICD-10-CM

## 2021-05-13 PROCEDURE — 97162 PT EVAL MOD COMPLEX 30 MIN: CPT | Performed by: PHYSICAL THERAPIST

## 2021-05-13 NOTE — PROGRESS NOTES
PT Evaluation     Today's date: 2021  Patient name: Jan Zabala  : 1970  MRN: [id number]  Referring provider: Oj Campbell DO  Dx:   Encounter Diagnosis     ICD-10-CM    1  Chronic right-sided thoracic back pain  M54 6 Ambulatory referral to Physical Therapy    G89 29     Pt stable on exam; suspect strain  Treat with heat to the region, OTC TylenolNSAIDs PRN, stretching, Methocarbamol PRN muscle spasm  PT referral                   Assessment  Assessment details: Patient is a 47 y/o male who presents with complaints of right sided thoracic pain  No further referral appears necessary at this time based upon examination results  Patient presents with the following impairments: decreased range of motion, TTP and decreased ability to perform functional tasks such as lifting and work duties  Prognosis is good given HEP compliance and PT 2x/wk tapering to 1x/wk over the next 4-6 weeks  Positive prognostic indicators include positive attitude toward recovery  Please contact me if you have any questions or recommendations  Thank you for the opportunity to share in care  Impairments: abnormal or restricted ROM, activity intolerance, lacks appropriate home exercise program, pain with function and poor posture     Goals  STG  Decrease pain by 50% in 4 weeks  Increase range of motion from moderate to no deficit in 4 weeks  Decrease TTP to no TTP in 4 weeks  LTG  Patient will be independent in hep in 4 weeks  Patient will be able to perform adls at [facility] by D/C  Patient will be able to perform work duties at [employer] by D/C      Plan  Patient would benefit from: skilled physical therapy  Referral necessary: No  Planned therapy interventions: abdominal trunk stabilization, joint mobilization, manual therapy, massage, neuromuscular re-education, patient education, postural training, strengthening, stretching, therapeutic activities, therapeutic exercise, home exercise program, functional ROM exercises, flexibility and body mechanics training  Frequency: 2x week  Duration in weeks: 6  Plan of Care beginning date: 2021  Plan of Care expiration date: 2021  Treatment plan discussed with: patient        Subjective Evaluation    History of Present Illness  Mechanism of injury: Patient reports his thoracic pain started last year but does not recall a specific event that started it  Notes that is has been going back and forth with getting better and worse until this past February  He notes he did a lot of moving furniture and housework and he tweaked it again  Patient blew out elbow which he was prescribed a steroid pack for  This ended up helping with his sciatica and his thoracic spine  Patient notes he has been having a good amount of pain in his back  It will come on as a sharp pain with certain movements (nothing specific)  The muscles are constantly on guard waiting for the pain  Patient reports it has been worsening over the past 3 weeks  Pain  Current pain ratin  At best pain ratin  At worst pain ratin  Quality: sharp, dull ache, discomfort and tight  Progression: worsening    Patient Goals  Patient goals for therapy: decreased pain  Patient goal: yard work; working out        Objective     Static Posture     Shoulders  Depressed and rounded  Palpation     Right   Muscle spasm in the cervical paraspinals and middle trapezius  Tenderness of the cervical paraspinals, middle trapezius and rhomboids       Active Range of Motion   Cervical/Thoracic Spine       Cervical    Flexion: 50 degrees   Extension: 50 degrees      Left lateral flexion: 45 degrees      Right lateral flexion: 35 degrees      Left rotation: 70 degrees  Right rotation: 70 degrees         Thoracic    Flexion:  WFL  Extension:  Restriction level: moderate  Left lateral flexion:  WFL  Right lateral flexion:  Restriction level: moderate  Left rotation:  WFL  Right rotation:  Restriction level: minimal    Joint Play Hypomobile: T4, T5, T6, T7 and T8     Strength/Myotome Testing     Left Shoulder   Normal muscle strength    Right Shoulder   Normal muscle strength                Precautions NA       Manuals        IASTM R thoracic        Cupping R thoracic                        Neuro Re-Ed         GTB rows        GTB ext                GTB no monies                                Ther Ex        UBE bwd                Wall slides        Rhomboid str        Thoracic ext over chair                                Ther Activity                        Gait Training                        Modalities

## 2021-05-17 ENCOUNTER — OFFICE VISIT (OUTPATIENT)
Dept: PHYSICAL THERAPY | Facility: MEDICAL CENTER | Age: 51
End: 2021-05-17
Payer: COMMERCIAL

## 2021-05-17 DIAGNOSIS — G89.29 CHRONIC RIGHT-SIDED THORACIC BACK PAIN: Primary | ICD-10-CM

## 2021-05-17 DIAGNOSIS — M54.6 CHRONIC RIGHT-SIDED THORACIC BACK PAIN: Primary | ICD-10-CM

## 2021-05-17 PROCEDURE — 97110 THERAPEUTIC EXERCISES: CPT | Performed by: PHYSICAL THERAPIST

## 2021-05-17 PROCEDURE — 97112 NEUROMUSCULAR REEDUCATION: CPT | Performed by: PHYSICAL THERAPIST

## 2021-05-17 PROCEDURE — 97140 MANUAL THERAPY 1/> REGIONS: CPT | Performed by: PHYSICAL THERAPIST

## 2021-05-17 NOTE — PROGRESS NOTES
Daily Note     Today's date: 2021  Patient name: Estella Sarkar  : 1970  MRN: [id number]  Referring provider: Abdullahi Orlando DO  Dx:   Encounter Diagnosis     ICD-10-CM    1  Chronic right-sided thoracic back pain  M54 6     G89 29                   Subjective: Patient reports he was doing really well on Friday during work day but then sat hard into his car and got a muscle spasm  This put him out the rest of Friday and Saturday  Notes that  and today were a bit better for him  Objective: See treatment diary below      Assessment: Tolerated treatment well  Patient exhibited good technique with therapeutic exercises and would benefit from continued PT  Patient reporting pain and tightness resolved post session today  Added to HEP  Plan: Continue per plan of care  Progress treatment as tolerated         Precautions NA       Manuals 21       IASTM R thoracic AK       Cupping R thoracic AK                       Neuro Re-Ed         GTB rows 3" 20x       GTB ext 3" 20x               GTB no monies 3" 20x                               Ther Ex        UBE bwd 5 min                Wall slides 10" 10x       Rhomboid str 20" 5x       Thoracic ext over chair 5" 10x                               Ther Activity                        Gait Training                        Modalities

## 2021-05-20 ENCOUNTER — OFFICE VISIT (OUTPATIENT)
Dept: PHYSICAL THERAPY | Facility: MEDICAL CENTER | Age: 51
End: 2021-05-20
Payer: COMMERCIAL

## 2021-05-20 DIAGNOSIS — M54.6 CHRONIC RIGHT-SIDED THORACIC BACK PAIN: Primary | ICD-10-CM

## 2021-05-20 DIAGNOSIS — G89.29 CHRONIC RIGHT-SIDED THORACIC BACK PAIN: Primary | ICD-10-CM

## 2021-05-20 PROCEDURE — 97112 NEUROMUSCULAR REEDUCATION: CPT | Performed by: PHYSICAL THERAPIST

## 2021-05-20 PROCEDURE — 97110 THERAPEUTIC EXERCISES: CPT | Performed by: PHYSICAL THERAPIST

## 2021-05-20 PROCEDURE — 97140 MANUAL THERAPY 1/> REGIONS: CPT | Performed by: PHYSICAL THERAPIST

## 2021-05-20 NOTE — PROGRESS NOTES
Daily Note     Today's date: 2021  Patient name: Chapito Florentino  : 1970  MRN: [id number]  Referring provider: Darell Mascorro DO  Dx:   Encounter Diagnosis     ICD-10-CM    1  Chronic right-sided thoracic back pain  M54 6     G89 29                   Subjective: Patient reports felt good for a day or two but woke up very stiff this morning  Believes he slept wrong because his neck was pretty stiff too  Objective: See treatment diary below      Assessment: Tolerated treatment well  Patient exhibited good technique with therapeutic exercises and would benefit from continued PT  Patient reporting tightness resolved post session today  Plan: Continue per plan of care  Progress treatment as tolerated         Precautions NA       Manuals 21      IASTM R thoracic AK AK      Cupping R thoracic AK AK                      Neuro Re-Ed         GTB rows 3" 20x 5" 20x      GTB ext 3" 20x 5" 20x              GTB no monies 3" 20x 5" 20x                              Ther Ex        UBE bwd 5 min  5 min L2              Wall slides 10" 10x 10"10x      Rhomboid str 20" 5x 20" 5x      Thoracic ext over chair 5" 10x 5" 15x              Lev stretch  15" 5x ea              Ther Activity                        Gait Training                        Modalities

## 2021-05-24 ENCOUNTER — OFFICE VISIT (OUTPATIENT)
Dept: PHYSICAL THERAPY | Facility: MEDICAL CENTER | Age: 51
End: 2021-05-24
Payer: COMMERCIAL

## 2021-05-24 DIAGNOSIS — G89.29 CHRONIC RIGHT-SIDED THORACIC BACK PAIN: Primary | ICD-10-CM

## 2021-05-24 DIAGNOSIS — M54.6 CHRONIC RIGHT-SIDED THORACIC BACK PAIN: Primary | ICD-10-CM

## 2021-05-24 PROCEDURE — 97110 THERAPEUTIC EXERCISES: CPT | Performed by: PHYSICAL THERAPIST

## 2021-05-24 PROCEDURE — 97112 NEUROMUSCULAR REEDUCATION: CPT | Performed by: PHYSICAL THERAPIST

## 2021-05-24 PROCEDURE — 97140 MANUAL THERAPY 1/> REGIONS: CPT | Performed by: PHYSICAL THERAPIST

## 2021-05-24 NOTE — PROGRESS NOTES
Daily Note     Today's date: 2021  Patient name: Megan Anderson  : 1970  MRN: [id number]  Referring provider: Dorcas Cabrera DO  Dx:   Encounter Diagnosis     ICD-10-CM    1  Chronic right-sided thoracic back pain  M54 6     G89 29                   Subjective: Patient reports feeling okay overall  Notices most of the pain/stiffness comes on after walking  Reports he plans to take a week off of walking and see if it improves  Objective: See treatment diary below      Assessment: Tolerated treatment well  Patient exhibited good technique with therapeutic exercises and would benefit from continued PT  No complaints post session  Patient noting it is feeling very loose  Plan: Continue per plan of care  Progress treatment as tolerated         Precautions NA       Manuals 21     IASTM R thoracic AK AK AK     Cupping R thoracic AK AK AK                     Neuro Re-Ed         GTB rows 3" 20x 5" 20x BTB 5" 20x     GTB ext 3" 20x 5" 20x BTB 5" 20x             GTB no monies 3" 20x 5" 20x BTB 5" 20x                             Ther Ex        UBE bwd 5 min  5 min L2 5 min L2             Wall slides 10" 10x 10"10x 10"10x     Rhomboid str 20" 5x 20" 5x 20" 5x     Thoracic ext over chair 5" 10x 5" 15x 5" 15x     Overhead SB stretch   15" 5x ea     Lev stretch  15" 5x ea HEP             Ther Activity                        Gait Training                        Modalities

## 2021-05-27 ENCOUNTER — OFFICE VISIT (OUTPATIENT)
Dept: PHYSICAL THERAPY | Facility: MEDICAL CENTER | Age: 51
End: 2021-05-27
Payer: COMMERCIAL

## 2021-05-27 DIAGNOSIS — M54.6 CHRONIC RIGHT-SIDED THORACIC BACK PAIN: Primary | ICD-10-CM

## 2021-05-27 DIAGNOSIS — G89.29 CHRONIC RIGHT-SIDED THORACIC BACK PAIN: Primary | ICD-10-CM

## 2021-05-27 PROCEDURE — 97110 THERAPEUTIC EXERCISES: CPT | Performed by: PHYSICAL THERAPIST

## 2021-05-27 PROCEDURE — 97112 NEUROMUSCULAR REEDUCATION: CPT | Performed by: PHYSICAL THERAPIST

## 2021-05-27 PROCEDURE — 97140 MANUAL THERAPY 1/> REGIONS: CPT | Performed by: PHYSICAL THERAPIST

## 2021-05-27 NOTE — PROGRESS NOTES
Daily Note     Today's date: 2021  Patient name: Jovanna Francisco  : 1970  MRN: [id number]  Referring provider: Sumeet Laura DO  Dx:   Encounter Diagnosis     ICD-10-CM    1  Chronic right-sided thoracic back pain  M54 6     G89 29                   Subjective: Patient reports the past few days have been good  Notes he was even able to go on a short walk this morning  Objective: See treatment diary below      Assessment: Tolerated treatment well  Patient exhibited good technique with therapeutic exercises and would benefit from continued PT  No complaints post session  Plan: Continue per plan of care  Progress treatment as tolerated         Precautions NA       Manuals 21    IASTM R thoracic AK AK AK AK    Cupping R thoracic AK AK AK AK                    Neuro Re-Ed         GTB rows 3" 20x 5" 20x BTB 5" 20x BTB 5" 20x    GTB ext 3" 20x 5" 20x BTB 5" 20x BTB 5" 20x            GTB no monies 3" 20x 5" 20x BTB 5" 20x BTB 5"20x                            Ther Ex        UBE bwd 5 min  5 min L2 5 min L2 5 min L2 5            Wall slides 10" 10x 10"10x 10"10x     Rhomboid str 20" 5x 20" 5x 20" 5x 20" 5x    Thoracic ext over chair 5" 10x 5" 15x 5" 15x 5" 15x    Overhead SB stretch   15" 5x ea     Lev stretch  15" 5x ea HEP             Ther Activity                        Gait Training                        Modalities

## 2021-10-06 ENCOUNTER — OFFICE VISIT (OUTPATIENT)
Dept: FAMILY MEDICINE CLINIC | Facility: CLINIC | Age: 51
End: 2021-10-06
Payer: COMMERCIAL

## 2021-10-06 VITALS
TEMPERATURE: 97.8 F | HEART RATE: 86 BPM | SYSTOLIC BLOOD PRESSURE: 118 MMHG | OXYGEN SATURATION: 98 % | BODY MASS INDEX: 33.43 KG/M2 | DIASTOLIC BLOOD PRESSURE: 80 MMHG | HEIGHT: 71 IN | RESPIRATION RATE: 14 BRPM | WEIGHT: 238.8 LBS

## 2021-10-06 DIAGNOSIS — Z12.11 SCREENING FOR COLORECTAL CANCER: ICD-10-CM

## 2021-10-06 DIAGNOSIS — Z12.5 SCREENING FOR PROSTATE CANCER: ICD-10-CM

## 2021-10-06 DIAGNOSIS — Z13.1 SCREENING FOR DIABETES MELLITUS: ICD-10-CM

## 2021-10-06 DIAGNOSIS — I10 ESSENTIAL HYPERTENSION: ICD-10-CM

## 2021-10-06 DIAGNOSIS — Z13.6 SCREENING FOR CARDIOVASCULAR CONDITION: ICD-10-CM

## 2021-10-06 DIAGNOSIS — Z00.00 ANNUAL PHYSICAL EXAM: Primary | ICD-10-CM

## 2021-10-06 DIAGNOSIS — Z12.12 SCREENING FOR COLORECTAL CANCER: ICD-10-CM

## 2021-10-06 DIAGNOSIS — R53.83 FATIGUE, UNSPECIFIED TYPE: ICD-10-CM

## 2021-10-06 DIAGNOSIS — Z23 IMMUNIZATION DUE: ICD-10-CM

## 2021-10-06 PROCEDURE — 90682 RIV4 VACC RECOMBINANT DNA IM: CPT

## 2021-10-06 PROCEDURE — 3725F SCREEN DEPRESSION PERFORMED: CPT | Performed by: FAMILY MEDICINE

## 2021-10-06 PROCEDURE — 3079F DIAST BP 80-89 MM HG: CPT | Performed by: FAMILY MEDICINE

## 2021-10-06 PROCEDURE — 3074F SYST BP LT 130 MM HG: CPT | Performed by: FAMILY MEDICINE

## 2021-10-06 PROCEDURE — 99396 PREV VISIT EST AGE 40-64: CPT | Performed by: FAMILY MEDICINE

## 2021-10-06 PROCEDURE — 3008F BODY MASS INDEX DOCD: CPT | Performed by: FAMILY MEDICINE

## 2021-10-06 PROCEDURE — 1036F TOBACCO NON-USER: CPT | Performed by: FAMILY MEDICINE

## 2021-10-06 PROCEDURE — 90471 IMMUNIZATION ADMIN: CPT

## 2021-12-27 DIAGNOSIS — I10 ESSENTIAL HYPERTENSION: ICD-10-CM

## 2021-12-27 RX ORDER — LISINOPRIL 5 MG/1
5 TABLET ORAL DAILY
Qty: 30 TABLET | Refills: 0 | Status: SHIPPED | OUTPATIENT
Start: 2021-12-27 | End: 2022-01-23

## 2022-01-10 ENCOUNTER — TELEPHONE (OUTPATIENT)
Dept: FAMILY MEDICINE CLINIC | Facility: CLINIC | Age: 52
End: 2022-01-10

## 2022-01-10 DIAGNOSIS — B34.9 VIRAL ILLNESS: Primary | ICD-10-CM

## 2022-01-10 PROCEDURE — U0003 INFECTIOUS AGENT DETECTION BY NUCLEIC ACID (DNA OR RNA); SEVERE ACUTE RESPIRATORY SYNDROME CORONAVIRUS 2 (SARS-COV-2) (CORONAVIRUS DISEASE [COVID-19]), AMPLIFIED PROBE TECHNIQUE, MAKING USE OF HIGH THROUGHPUT TECHNOLOGIES AS DESCRIBED BY CMS-2020-01-R: HCPCS | Performed by: FAMILY MEDICINE

## 2022-01-10 PROCEDURE — U0005 INFEC AGEN DETEC AMPLI PROBE: HCPCS | Performed by: FAMILY MEDICINE

## 2022-01-10 NOTE — TELEPHONE ENCOUNTER
Pt has a covid positive person in the home  Pt has nasal congestion and sore throat that began on 1/9/2022  Requests a covid test  Pt is vaccinated

## 2022-01-11 LAB — SARS-COV-2 RNA RESP QL NAA+PROBE: POSITIVE

## 2022-01-14 ENCOUNTER — TELEMEDICINE (OUTPATIENT)
Dept: FAMILY MEDICINE CLINIC | Facility: CLINIC | Age: 52
End: 2022-01-14
Payer: COMMERCIAL

## 2022-01-14 DIAGNOSIS — U07.1 COVID-19: Primary | ICD-10-CM

## 2022-01-14 PROCEDURE — 99441 PR PHYS/QHP TELEPHONE EVALUATION 5-10 MIN: CPT | Performed by: FAMILY MEDICINE

## 2022-01-14 RX ORDER — FLUTICASONE PROPIONATE 50 MCG
2 SPRAY, SUSPENSION (ML) NASAL DAILY
Qty: 9.9 ML | Refills: 1 | Status: SHIPPED | OUTPATIENT
Start: 2022-01-14

## 2022-01-14 NOTE — PROGRESS NOTES
COVID-19 Outpatient Progress Note    Assessment/Plan:    Problem List Items Addressed This Visit     None      Visit Diagnoses     COVID-19    -  Primary    Relevant Medications    fluticasone (FLONASE) 50 mcg/act nasal spray         Disposition:     Patient is fully vaccinated and I recommended self quarantine for 5 days followed by strict mask use for an additional 5 days  If patient were to develop symptoms, they should immediately self isolate and call our office for further guidance  Discussed vitamin D, vitamin C, and/or zinc supplementation with patient  I have spent 10 minutes directly with the patient  Greater than 50% of this time was spent in counseling/coordination of care regarding: instructions for management, risk factor reductions and impressions  Day 5 of illness   Vaccinated   Mild presentation   Not a candidate for MAB infusion   Continue nasal irrigation   Will send rx for Flonase 2 sprays daily   May also try antihistamines for nasal congestion   Call precautions reviewed   Follow up prn        Encounter provider Jeffery Burr MD    Provider located at [address] [address]    Recent Visits  Date Type Provider Dept   01/10/22 Telephone Lillian Gómez   Showing recent visits within past 7 days and meeting all other requirements  Today's Visits  Date Type Provider Dept   01/14/22 Telemedicine MD Dieter Lane Ion today's visits and meeting all other requirements  Future Appointments  No visits were found meeting these conditions  Showing future appointments within next 150 days and meeting all other requirements     This virtual check-in was done via telephone and he agrees to proceed  Patient agrees to participate in a virtual check in via telephone or video visit instead of presenting to the office to address urgent/immediate medical needs   Patient is aware this is a billable service  After connecting through Telephone, the patient was identified by name and date of birth  Art Bran was informed that this was a telemedicine visit and that the exam was being conducted confidentially over secure lines  My office door was closed  No one else was in the room  Art Bran acknowledged consent and understanding of privacy and security of the telemedicine visit  I informed the patient that I have reviewed his record in Epic and presented the opportunity for him to ask any questions regarding the visit today  The patient agreed to participate  It was my intent to perform this visit via video technology but the patient was not able to do a video connection so the visit was completed via audio telephone only  Verification of patient location:  Patient is located in the following state in which I hold an active license: [place]    Subjective:   Art Bran is a 46 y o  male who is concerned about COVID-19  Patient's symptoms include chills, fatigue (improved), nasal congestion, cough, nausea and headache (improved)  Patient denies fever, anosmia, loss of taste, shortness of breath, chest tightness, abdominal pain, vomiting and diarrhea  - Date of symptom onset: 1/9/2022      COVID-19 vaccination status: Fully vaccinated (primary series)      Day 5 of illness   PCR + on Monday   Having nasal congestion and PND   Prone to getting sinus infections and has been doing nasal irrigation several times a day       Lab Results   Component Value Date    SARSCOV2 Positive (A) 01/10/2022     No past medical history on file  No past surgical history on file    Current Outpatient Medications   Medication Sig Dispense Refill    fluticasone (FLONASE) 50 mcg/act nasal spray 2 sprays into each nostril daily 9 9 mL 1    lisinopril (ZESTRIL) 5 mg tablet Take 1 tablet (5 mg total) by mouth daily 30 tablet 0    multivitamin (THERAGRAN) TABS Take 1 tablet by mouth daily      Omega-3 Fatty Acids (FISH OIL) 1200 MG CAPS Take by mouth       No current facility-administered medications for this visit  Allergies   Allergen Reactions    Seasonal Ic [Cholestatin]        Review of Systems   Constitutional: Positive for chills and fatigue (improved)  Negative for fever  HENT: Positive for congestion and postnasal drip  Respiratory: Positive for cough  Negative for chest tightness and shortness of breath  Gastrointestinal: Positive for nausea  Negative for abdominal pain, diarrhea and vomiting  Neurological: Positive for headaches (improved)  Objective: There were no vitals filed for this visit  VIRTUAL VISIT DISCLAIMER    Zeb Walker verbally agrees to participate in [employer]  Pt is aware that [employer] could be limited without vital signs or the ability to perform a full hands-on physical Gissell Pickard understands he or the provider may request at any time to terminate the video visit and request the patient to seek care or treatment in person

## 2022-01-23 DIAGNOSIS — I10 ESSENTIAL HYPERTENSION: ICD-10-CM

## 2022-01-23 RX ORDER — LISINOPRIL 5 MG/1
TABLET ORAL
Qty: 30 TABLET | Refills: 0 | Status: SHIPPED | OUTPATIENT
Start: 2022-01-23 | End: 2022-02-16 | Stop reason: SDUPTHER

## 2022-02-01 ENCOUNTER — TELEMEDICINE (OUTPATIENT)
Dept: FAMILY MEDICINE CLINIC | Facility: CLINIC | Age: 52
End: 2022-02-01
Payer: COMMERCIAL

## 2022-02-01 VITALS — HEART RATE: 85 BPM | OXYGEN SATURATION: 99 % | TEMPERATURE: 97.8 F

## 2022-02-01 DIAGNOSIS — S69.91XD INJURY OF RIGHT INDEX FINGER, SUBSEQUENT ENCOUNTER: Primary | ICD-10-CM

## 2022-02-01 DIAGNOSIS — Z86.16 PERSONAL HISTORY OF COVID-19: ICD-10-CM

## 2022-02-01 PROCEDURE — 99213 OFFICE O/P EST LOW 20 MIN: CPT | Performed by: FAMILY MEDICINE

## 2022-02-01 NOTE — PROGRESS NOTES
Virtual Regular Visit    Verification of patient location:    Patient is located in the following state in which I hold an active license [place]      Assessment/Plan:    Problem List Items Addressed This Visit     None      Visit Diagnoses     Injury of right index finger, subsequent encounter    -  Primary    Pt stable  Will get xrays at this time, and pt referred to Ortho  Pt can perform warm soaks, can use an OTC stress ball/matt/Play-Otby for self-PT  Relevant Orders    XR hand 3+ vw right    Ambulatory Referral to Orthopedic Surgery    Personal history of COVID-19        Appears resolved at this time, and pt has been already cleared from self-isolation  Pt is vaccinated against COV-19  Reason for visit is   Chief Complaint   Patient presents with    Hand Pain     patient being seen for right index finger pain x 7 months  jammed on lawn tractor     Virtual Regular Visit        Encounter provider Kong Sesay DO    Provider located at [address] [address]      Recent Visits  No visits were found meeting these conditions  Showing recent visits within past 7 days and meeting all other requirements  Today's Visits  Date Type Provider Dept   02/01/22 Telemedicine [address] Denice Sorto DO [facility]   Showing today's visits and meeting all other requirements  Future Appointments  No visits were found meeting these conditions  Showing future appointments within next 150 days and meeting all other requirements       The patient was identified by name and date of birth  Zeb Walker was informed that this is a telemedicine visit and that the visit is being conducted through [facility] and patient was informed that this is not a secure, HIPAA-compliant platform  He agrees to proceed     My office door was closed  No one else was in the room    He acknowledged consent and understanding of privacy and security of the video [FreeTextEntry1] :  D/W caregiver viral URI with bronchiolitis- recommend supportive care including antipyretics, fluids, and nasal saline followed by nasal suction. Return if symptoms worsen or persist.\par D/W caregiver conjunctivitis, advise antibiotics as below; reviewed supportive care including antipyretics as needs, keep well hydrated; monitor for eyelid swelling, difficulty moving the eye, worsening redness and call if occurring for recheck.\par  Answered patient questions about COVID-19 including signs and symptoms, self home care and proper isolation precautions. Parent/patient declined COVID 19 testing today and declined RSV testing. \par time spent: 30min\par  platform  The patient has agreed to participate and understands they can discontinue the visit at any time  Patient is aware this is a billable service  Subjective  Sara Ayala is a 46 y o  male presents today in regards to a chronic finger injury that he has  Pt cleared COV-19 1/14/22 (saw Dr Sally Gallego in [address] [facility])  Jammed the right index finger when riding his  - has bother him since (7 months)  ROM limited  As above  History reviewed  No pertinent past medical history  History reviewed  No pertinent surgical history  Current Outpatient Medications   Medication Sig Dispense Refill    lisinopril (ZESTRIL) 5 mg tablet TAKE ONE TABLET BY MOUTH ONE TIME DAILY 30 tablet 0    multivitamin (THERAGRAN) TABS Take 1 tablet by mouth daily      Omega-3 Fatty Acids (FISH OIL) 1200 MG CAPS Take by mouth      fluticasone (FLONASE) 50 mcg/act nasal spray 2 sprays into each nostril daily (Patient not taking: Reported on 2/1/2022 ) 9 9 mL 1     No current facility-administered medications for this visit  Allergies   Allergen Reactions    Seasonal Ic [Cholestatin]        Review of Systems   Constitutional: Negative for activity change, fatigue and fever  Respiratory: Positive for cough  Negative for shortness of breath  Some residual cough, but much improved  Cardiovascular: Negative for chest pain  Musculoskeletal: Positive for arthralgias  Negative for myalgias  Right index finger; chronic pain at the PIP joint  Video Exam    Vitals:    02/01/22 1620   Pulse: 85   Temp: 97 8 °F (36 6 °C)   TempSrc: Temporal   SpO2: 99%       Physical Exam  Vitals reviewed  Constitutional:       General: He is not in acute distress  Appearance: Normal appearance  He is not ill-appearing, toxic-appearing or diaphoretic  HENT:      Head: Normocephalic and atraumatic  Eyes:      General: No scleral icterus       Conjunctiva/sclera: Conjunctivae normal  Pulmonary:      Effort: Pulmonary effort is normal  No respiratory distress  Musculoskeletal:        Hands:    Neurological:      Mental Status: He is alert and oriented to person, place, and time  Psychiatric:         Mood and Affect: Mood normal          Behavior: Behavior normal          Thought Content: Thought content normal          Judgment: Judgment normal        Ramirez De Jesus was seen today for hand pain and virtual regular visit  Diagnoses and all orders for this visit:    Injury of right index finger, subsequent encounter  Comments:  Pt stable  Will get xrays at this time, and pt referred to Ortho  Pt can perform warm soaks, can use an OTC stress ball/matt/Play-Toby for self-PT  Orders:  -     XR hand 3+ vw right; Future  -     Ambulatory Referral to Orthopedic Surgery; Future    Personal history of COVID-19  Comments:  Appears resolved at this time, and pt has been already cleared from self-isolation  Pt is vaccinated against COV-19  I spent 15 minutes with patient today in which greater than 50% of the time was spent in counseling/coordination of care regarding his symptoms  VIRTUAL VISIT DISCLAIMER      Summer Haynes verbally agrees to participate in [employer]  Pt is aware that [employer] could be limited without vital signs or the ability to perform a full hands-on physical Carey Abbott understands he or the provider may request at any time to terminate the video visit and request the patient to seek care or treatment in person

## 2022-02-03 ENCOUNTER — APPOINTMENT (OUTPATIENT)
Dept: RADIOLOGY | Facility: MEDICAL CENTER | Age: 52
End: 2022-02-03
Payer: COMMERCIAL

## 2022-02-03 DIAGNOSIS — S69.91XD INJURY OF RIGHT INDEX FINGER, SUBSEQUENT ENCOUNTER: ICD-10-CM

## 2022-02-03 PROCEDURE — 73130 X-RAY EXAM OF HAND: CPT

## 2022-02-12 NOTE — RESULT ENCOUNTER NOTE
Please let the patient know that their hand xrays were normal - no fractures were seen  Thanks     Dr Ganga Avina

## 2022-02-16 DIAGNOSIS — I10 ESSENTIAL HYPERTENSION: ICD-10-CM

## 2022-02-16 RX ORDER — LISINOPRIL 5 MG/1
5 TABLET ORAL DAILY
Qty: 90 TABLET | Refills: 3 | Status: SHIPPED | OUTPATIENT
Start: 2022-02-16

## 2022-02-21 ENCOUNTER — OFFICE VISIT (OUTPATIENT)
Dept: OBGYN CLINIC | Facility: CLINIC | Age: 52
End: 2022-02-21
Payer: COMMERCIAL

## 2022-02-21 VITALS
HEART RATE: 98 BPM | WEIGHT: 242.6 LBS | SYSTOLIC BLOOD PRESSURE: 151 MMHG | BODY MASS INDEX: 34.73 KG/M2 | DIASTOLIC BLOOD PRESSURE: 94 MMHG | HEIGHT: 70 IN

## 2022-02-21 DIAGNOSIS — M25.641 STIFFNESS OF FINGER JOINT OF RIGHT HAND: Primary | ICD-10-CM

## 2022-02-21 DIAGNOSIS — S69.91XD INJURY OF RIGHT INDEX FINGER, SUBSEQUENT ENCOUNTER: ICD-10-CM

## 2022-02-21 PROBLEM — S69.91XA INJURY OF RIGHT INDEX FINGER: Status: ACTIVE | Noted: 2022-02-21

## 2022-02-21 PROCEDURE — 3080F DIAST BP >= 90 MM HG: CPT | Performed by: PHYSICAL MEDICINE & REHABILITATION

## 2022-02-21 PROCEDURE — 1036F TOBACCO NON-USER: CPT | Performed by: PHYSICAL MEDICINE & REHABILITATION

## 2022-02-21 PROCEDURE — 3008F BODY MASS INDEX DOCD: CPT | Performed by: PHYSICAL MEDICINE & REHABILITATION

## 2022-02-21 PROCEDURE — 3077F SYST BP >= 140 MM HG: CPT | Performed by: PHYSICAL MEDICINE & REHABILITATION

## 2022-02-21 PROCEDURE — 99203 OFFICE O/P NEW LOW 30 MIN: CPT | Performed by: PHYSICAL MEDICINE & REHABILITATION

## 2022-02-21 NOTE — LETTER
February 21, 2022     Adilia Tobin, [address] [address] 100  [address] [address]    Patient: Tru Gerardo   YOB: 1970   Date of Visit: 2/21/2022       Dear Dr Ricky Sotro:    Thank you for referring Tru Gerardo to me for evaluation  Below are my notes for this consultation  If you have questions, please do not hesitate to call me  I look forward to following your patient along with you  Sincerely,        Michael Ivey DO        CC: No Recipients  [place],   2/21/2022  9:36 AM  Signed  1  Stiffness of finger joint of right hand  Ambulatory referral to PT/OT hand therapy   2  Injury of right index finger, subsequent encounter  Ambulatory Referral to Orthopedic Surgery    Pt stable  Will get xrays at this time, and pt referred to Ortho  Pt can perform warm soaks, can use an OTC stress ball/matt/Play-Toby for self-PT  Orders Placed This Encounter   Procedures    Ambulatory referral to PT/OT hand therapy        Impression:  Right 2nd digit pain likely secondary to capsular sprain leading to stiffness  We will have the patient start hand therapy for a couple of sessions and then he can transition to home exercise program   He can try Epsom salt soaks  If still symptomatic, could consider advanced imaging  Return to clinic if needed  Imaging Studies (I personally reviewed images in PACS and report):  Right 2nd digit x-rays most recent to this encounter reviewed  These images show no acute osseous abnormalities or severe degeneration  Return if symptoms worsen or fail to improve  Patient is in agreement with the above plan  HPI:  Tru Gerardo is a 46 y o  male  who presents for evaluation of   Chief Complaint   Patient presents with    Right Index Finger - Pain, Swelling       Onset/Mechanism: Summer 2021- he bent his finger to the side and it swelled up and he could not move it  Location: 2nd PIP  Radiation: Proximal phalanx    Provocative: Random flares without an inciting event  Severity: Up and down  Associated Symptoms: Difficulty flexing his finger  Treatment so far: Brace OTC  Following history reviewed and updated:  History reviewed  No pertinent past medical history  History reviewed  No pertinent surgical history  Social History   Social History     Substance and Sexual Activity   Alcohol Use No     Social History     Substance and Sexual Activity   Drug Use No     Social History     Tobacco Use   Smoking Status Former Smoker    Quit date:     Years since quittin 1   Smokeless Tobacco Never Used     Family History   Problem Relation Age of Onset    Diabetes Mother     Hypertension Father         Benign essential    Hypertension Brother      Allergies   Allergen Reactions    Seasonal Ic [Cholestatin]         Constitutional:  /94   Pulse 98   Ht 5' 10" (1 778 m)   Wt 110 kg (242 lb 9 6 oz)   BMI 34 81 kg/m²    General: NAD  Eyes: Anicteric sclerae  Neck: Supple  Lungs: Unlabored breathing  Cardiovascular: No lower extremity edema  Skin: Intact without erythema  Neurologic: Sensation intact to light touch  Psychiatric: Mood and affect are appropriate  Right Hand Exam     Tenderness   Right hand tenderness location: Right 2nd proximal phalanx/PIP  Range of Motion   The patient has normal right wrist ROM  Muscle Strength   The patient has normal right wrist strength      Other   Erythema: absent  Scars: absent  Sensation: normal  Pulse: present             Procedures

## 2022-02-21 NOTE — PROGRESS NOTES
1  Stiffness of finger joint of right hand  Ambulatory referral to PT/OT hand therapy   2  Injury of right index finger, subsequent encounter  Ambulatory Referral to Orthopedic Surgery    Pt stable  Will get xrays at this time, and pt referred to Ortho  Pt can perform warm soaks, can use an OTC stress ball/matt/Play-Toby for self-PT  Orders Placed This Encounter   Procedures    Ambulatory referral to PT/OT hand therapy        Impression:  Right 2nd digit pain likely secondary to capsular sprain leading to stiffness  We will have the patient start hand therapy for a couple of sessions and then he can transition to home exercise program   He can try Epsom salt soaks  If still symptomatic, could consider advanced imaging  Return to clinic if needed  Imaging Studies (I personally reviewed images in PACS and report):  Right 2nd digit x-rays most recent to this encounter reviewed  These images show no acute osseous abnormalities or severe degeneration  Return if symptoms worsen or fail to improve  Patient is in agreement with the above plan  HPI:  Denise Daly is a 46 y o  male  who presents for evaluation of   Chief Complaint   Patient presents with    Right Index Finger - Pain, Swelling       Onset/Mechanism: Summer 2021- he bent his finger to the side and it swelled up and he could not move it  Location: 2nd PIP  Radiation: Proximal phalanx  Provocative: Random flares without an inciting event  Severity: Up and down  Associated Symptoms: Difficulty flexing his finger  Treatment so far: Brace OTC  Following history reviewed and updated:  History reviewed  No pertinent past medical history  History reviewed  No pertinent surgical history    Social History   Social History     Substance and Sexual Activity   Alcohol Use No     Social History     Substance and Sexual Activity   Drug Use No     Social History     Tobacco Use   Smoking Status Former Smoker    Quit date: 1998    Years since quittin 1   Smokeless Tobacco Never Used     Family History   Problem Relation Age of Onset    Diabetes Mother     Hypertension Father         Benign essential    Hypertension Brother      Allergies   Allergen Reactions    Seasonal Ic [Cholestatin]         Constitutional:  /94   Pulse 98   Ht 5' 10" (1 778 m)   Wt 110 kg (242 lb 9 6 oz)   BMI 34 81 kg/m²    General: NAD  Eyes: Anicteric sclerae  Neck: Supple  Lungs: Unlabored breathing  Cardiovascular: No lower extremity edema  Skin: Intact without erythema  Neurologic: Sensation intact to light touch  Psychiatric: Mood and affect are appropriate  Right Hand Exam     Tenderness   Right hand tenderness location: Right 2nd proximal phalanx/PIP  Range of Motion   The patient has normal right wrist ROM  Muscle Strength   The patient has normal right wrist strength      Other   Erythema: absent  Scars: absent  Sensation: normal  Pulse: present             Procedures

## 2022-02-21 NOTE — PATIENT INSTRUCTIONS
Room temperature/warm soaks with epsom salt can help with muscle tightness/cramping  Epsom salt releases magnesium which can be helpful  You will be starting hand therapy  It is important to do home exercises as given by your physical therapist as you go through PT to speed up your recovery  Physical therapy addresses the problems that are causing your pain, instead of covering them up, as some other treatment options do

## 2022-04-06 ENCOUNTER — OFFICE VISIT (OUTPATIENT)
Dept: FAMILY MEDICINE CLINIC | Facility: CLINIC | Age: 52
End: 2022-04-06
Payer: COMMERCIAL

## 2022-04-06 VITALS
HEART RATE: 89 BPM | HEIGHT: 70 IN | WEIGHT: 250 LBS | SYSTOLIC BLOOD PRESSURE: 124 MMHG | TEMPERATURE: 97.4 F | BODY MASS INDEX: 35.79 KG/M2 | DIASTOLIC BLOOD PRESSURE: 86 MMHG | RESPIRATION RATE: 12 BRPM | OXYGEN SATURATION: 99 %

## 2022-04-06 DIAGNOSIS — E53.8 B12 DEFICIENCY: ICD-10-CM

## 2022-04-06 DIAGNOSIS — E55.9 VITAMIN D DEFICIENCY: ICD-10-CM

## 2022-04-06 DIAGNOSIS — I10 ESSENTIAL HYPERTENSION: Primary | ICD-10-CM

## 2022-04-06 DIAGNOSIS — Z11.59 NEED FOR HEPATITIS C SCREENING TEST: ICD-10-CM

## 2022-04-06 DIAGNOSIS — Z13.6 SCREENING FOR CARDIOVASCULAR CONDITION: ICD-10-CM

## 2022-04-06 DIAGNOSIS — Z13.1 SCREENING FOR DIABETES MELLITUS: ICD-10-CM

## 2022-04-06 DIAGNOSIS — Z12.5 SCREENING FOR PROSTATE CANCER: ICD-10-CM

## 2022-04-06 PROCEDURE — 3008F BODY MASS INDEX DOCD: CPT | Performed by: FAMILY MEDICINE

## 2022-04-06 PROCEDURE — 3074F SYST BP LT 130 MM HG: CPT | Performed by: FAMILY MEDICINE

## 2022-04-06 PROCEDURE — 3079F DIAST BP 80-89 MM HG: CPT | Performed by: FAMILY MEDICINE

## 2022-04-06 PROCEDURE — 3725F SCREEN DEPRESSION PERFORMED: CPT | Performed by: FAMILY MEDICINE

## 2022-04-06 PROCEDURE — 1036F TOBACCO NON-USER: CPT | Performed by: FAMILY MEDICINE

## 2022-04-06 PROCEDURE — 99213 OFFICE O/P EST LOW 20 MIN: CPT | Performed by: FAMILY MEDICINE

## 2022-04-06 NOTE — PROGRESS NOTES
FAMILY PRACTICE OFFICE VISIT       NAME: Loretta Jensen  AGE: 46 y o  SEX: male       : 1970        MRN: [id number]    DATE: 2022  TIME: 10:49 AM    Assessment and Plan   1  Essential hypertension  Comments:  Reasonable control on present management  Jessenia Chairez is to continue a lower carb/salt diet, and to get back to regular exercise  FBW ordered  2  Need for hepatitis C screening test  Comments:  One time screen  Orders:  -     Hepatitis C Antibody ([facility]); Future    3  Screening for diabetes mellitus  -     Comprehensive metabolic panel; Future    4  Screening for cardiovascular condition  -     Lipid Panel with Direct LDL reflex; Future    5  Screening for prostate cancer  Comments:  PSA incl with FBW ordered  Orders:  -     PSA, Total Screen; Future; Expected date: 2022    6  B12 deficiency  Comments:  Hx of  Orders:  -     Vitamin B12; Future    7  Vitamin D deficiency  Comments:  Hx of  Orders:  -     Vitamin D 25 hydroxy; Future         There are no Patient Instructions on file for this visit  Chief Complaint     Chief Complaint   Patient presents with    Follow-up     patient being seen for 6 month follow up        History of Present Illness   Loretta Jensen is a 46y o -year-old male who presents in f/u today  Jessenia Chairez cleared COV-19 in 2022 - doing well; cough lingered for several weeks  Pt is vaccinated  Diet has not been very good  Just started to cycle a little again  COV-19 did set him back  Review of Systems   Review of Systems   Constitutional: Negative for activity change  Respiratory: Negative for cough and shortness of breath  Cardiovascular: Negative for chest pain  Active Problem List     Patient Active Problem List   Diagnosis    Essential hypertension    Injury of right index finger         Past Medical History:  History reviewed  No pertinent past medical history  Past Surgical History:  History reviewed   No pertinent surgical history  Family History:  Family History   Problem Relation Age of Onset    Diabetes Mother     Hypertension Father         Benign essential    Hypertension Brother        Social History:  Social History     Socioeconomic History    Marital status:      Spouse name: Not on file    Number of children: 1    Years of education: College Grad    Highest education level: Not on file   Occupational History    Occupation:    Tobacco Use    Smoking status: Former Smoker     Quit date:      Years since quittin 2    Smokeless tobacco: Never Used   Vaping Use    Vaping Use: Never used   Substance and Sexual Activity    Alcohol use: No    Drug use: No    Sexual activity: Yes     Partners: Female   Other Topics Concern    Not on file   Social History Narrative    1 step-child, 1 child     Social Determinants of Health     Financial Resource Strain: Not on file   Food Insecurity: Not on file   Transportation Needs: Not on file   Physical Activity: Not on file   Stress: Not on file   Social Connections: Not on file   Intimate Partner Violence: Not on file   Housing Stability: Not on file       Objective     Vitals:    22 0816   BP: 124/86   Pulse:    Resp:    Temp:    SpO2:      Wt Readings from Last 3 Encounters:   22 113 kg (250 lb)   22 110 kg (242 lb 9 6 oz)   10/06/21 108 kg (238 lb 12 8 oz)       Physical Exam  Vitals and nursing note reviewed  Constitutional:       General: He is not in acute distress  Appearance: Normal appearance  He is not ill-appearing, toxic-appearing or diaphoretic  HENT:      Head: Normocephalic and atraumatic  Eyes:      General: No scleral icterus  Conjunctiva/sclera: Conjunctivae normal    Cardiovascular:      Rate and Rhythm: Normal rate and regular rhythm  Heart sounds: Normal heart sounds  No murmur heard  No friction rub  No gallop      Pulmonary:      Effort: Pulmonary effort is normal  No respiratory distress  Breath sounds: Normal breath sounds  No stridor  No wheezing, rhonchi or rales  Musculoskeletal:      Cervical back: Normal range of motion and neck supple  No rigidity or tenderness  Lymphadenopathy:      Cervical: No cervical adenopathy  Neurological:      Mental Status: He is alert and oriented to person, place, and time  Psychiatric:         Mood and Affect: Mood normal          Behavior: Behavior normal          Thought Content:  Thought content normal          Judgment: Judgment normal          Pertinent Laboratory/Diagnostic Studies:  Lab Results   Component Value Date    GLUCOSE 106 (H) 04/05/2017    BUN 13 09/04/2020    CREATININE 0 91 09/04/2020    CALCIUM 9 5 04/05/2017     04/05/2017    K 5 2 09/04/2020    CO2 25 09/04/2020     09/04/2020     Lab Results   Component Value Date    ALT 43 09/04/2020    AST 24 09/04/2020    ALKPHOS 88 04/05/2017    BILITOT 0 4 04/05/2017       Lab Results   Component Value Date    WBC 8 6 09/04/2020    HGB 16 9 09/04/2020    HCT 50 4 09/04/2020    MCV 88 09/04/2020     09/04/2020       Lab Results   Component Value Date    TSH 1 210 09/04/2020       Lab Results   Component Value Date    CHOL 181 04/05/2017     Lab Results   Component Value Date    TRIG 235 (H) 09/04/2020     Lab Results   Component Value Date    HDL 38 (L) 09/04/2020     Lab Results   Component Value Date    LDLCALC 129 (H) 09/04/2020     No results found for: HGBA1C    Results for orders placed or performed in visit on 01/10/22   [facility] [address] or [facility]    Specimen: Nose; Nares   Result Value Ref Range    SARS-CoV-2 Positive (A) Negative       Orders Placed This Encounter   Procedures    Hepatitis C Antibody ([facility])    Comprehensive metabolic panel    Lipid Panel with Direct LDL reflex    PSA, Total Screen    Vitamin B12    Vitamin D 25 hydroxy       ALLERGIES:  Allergies   Allergen Reactions    Seasonal Ic [Cholestatin] Current Medications     Current Outpatient Medications   Medication Sig Dispense Refill    lisinopril (ZESTRIL) 5 mg tablet Take 1 tablet (5 mg total) by mouth daily 90 tablet 3    multivitamin (THERAGRAN) TABS Take 1 tablet by mouth daily      Omega-3 Fatty Acids (FISH OIL) 1200 MG CAPS Take by mouth      fluticasone (FLONASE) 50 mcg/act nasal spray 2 sprays into each nostril daily (Patient not taking: Reported on 2/1/2022 ) 9 9 mL 1     No current facility-administered medications for this visit  Health Maintenance     Health Maintenance   Topic Date Due    Hepatitis C Screening  Never done    Colorectal Cancer Screening  Never done    COVID-19 Vaccine (3 - Booster for Pfizer series) 10/08/2021    BMI: Followup Plan  03/03/2022    HIV Screening  05/12/2023 (Originally 3/16/1985)    Annual Physical  10/06/2022    Depression Screening  04/06/2023    BMI: Adult  04/06/2023    DTaP,Tdap,and Td Vaccines (3 - Td or Tdap) 09/09/2030    Influenza Vaccine  Completed    Pneumococcal Vaccine: Pediatrics (0 to 5 Years) and At-Risk Patients (6 to 59 Years)  Aged Out    HIB Vaccine  Aged Out    Hepatitis B Vaccine  Aged Out    IPV Vaccine  Aged Out    Hepatitis A Vaccine  Aged Out    Meningococcal ACWY Vaccine  Aged Out    HPV Vaccine  Aged [employer] History   Administered Date(s) Administered    COVID-19 PFIZER VACCINE 0 3 ML IM 04/17/2021, 05/08/2021    INFLUENZA 10/28/2015, 09/29/2016, 10/11/2017    Influenza Quadrivalent Preservative Free 3 years and older IM 10/21/2014, 09/29/2016    Influenza Quadrivalent, 6-35 Months IM 10/28/2015    Influenza, injectable, quadrivalent, preservative free 0 5 mL 10/24/2018, 10/23/2019    Influenza, recombinant, quadrivalent,injectable, preservative free 09/09/2020, 10/06/2021    Influenza, seasonal, injectable 10/11/2017    Tdap 01/13/2009, 09/09/2020     BMI Counseling: Body mass index is 35 87 kg/m²   The BMI is above normal  Nutrition recommendations include moderation in carbohydrate intake  Exercise recommendations include exercising 3-5 times per week  No pharmacotherapy was ordered  Patient referred to PCP  Rationale for BMI follow-up plan is due to patient being overweight or obese  Depression Screening and Follow-up Plan: Patient was screened for depression during today's encounter  They screened negative with a PHQ-2 score of 0          Prateek Huang,

## 2022-10-12 ENCOUNTER — OFFICE VISIT (OUTPATIENT)
Dept: FAMILY MEDICINE CLINIC | Facility: CLINIC | Age: 52
End: 2022-10-12
Payer: COMMERCIAL

## 2022-10-12 VITALS
DIASTOLIC BLOOD PRESSURE: 92 MMHG | BODY MASS INDEX: 35.59 KG/M2 | HEART RATE: 88 BPM | WEIGHT: 248.6 LBS | RESPIRATION RATE: 14 BRPM | OXYGEN SATURATION: 97 % | SYSTOLIC BLOOD PRESSURE: 132 MMHG | TEMPERATURE: 97.8 F | HEIGHT: 70 IN

## 2022-10-12 DIAGNOSIS — I10 ESSENTIAL HYPERTENSION: ICD-10-CM

## 2022-10-12 DIAGNOSIS — Z12.11 SCREENING FOR COLON CANCER: ICD-10-CM

## 2022-10-12 DIAGNOSIS — Z00.00 ANNUAL PHYSICAL EXAM: Primary | ICD-10-CM

## 2022-10-12 PROCEDURE — 99396 PREV VISIT EST AGE 40-64: CPT | Performed by: FAMILY MEDICINE

## 2022-10-12 NOTE — PROGRESS NOTES
[facility]    NAME: Summer Haynes  AGE: 46 y o  SEX: male  : 1970     DATE: 10/12/2022     Assessment and Plan:     Problem List Items Addressed This Visit        Cardiovascular and Mediastinum    Essential hypertension      Other Visit Diagnoses     Annual physical exam    -  Primary    Ernestina Andrews is stable on exam   He is to continue a healthy, lower carb/salt diet, and get regular exercise  FBW slips reprinted for him  Screening for colon cancer        Pt referred to GI again today  Relevant Orders    Ambulatory Referral to Gastroenterology    BMI 35 0-35 9,adult        Diet and exercise as above  Immunizations and preventive care screenings were discussed with patient today  Appropriate education was printed on patient's after visit summary  Discussed risks and benefits of prostate cancer screening  We discussed the controversial history of PSA screening for prostate cancer in the [place] as well as the risk of over detection and over treatment of prostate cancer by way of PSA screening  The patient understands that PSA blood testing is an imperfect way to screen for prostate cancer and that elevated PSA levels in the blood may also be caused by infection, inflammation, prostatic trauma or manipulation, urological procedures, or by benign prostatic enlargement  The role of the digital rectal examination in prostate cancer screening was also discussed and I discussed with him that there is large interobserver variability in the findings of digital rectal examination  Counseling:  Dental Health: discussed importance of regular tooth brushing, flossing, and dental visits  · Exercise: the importance of regular exercise/physical activity was discussed  Recommend exercise 3-5 times per week for at least 30 minutes         Depression Screening and Follow-up Plan: Patient was screened for depression during today's encounter  They screened negative with a PHQ-2 score of 0  Return in 6 months (on 4/12/2023)  Chief Complaint:     Chief Complaint   Patient presents with   • Physical Exam     Patient being seen for physical       History of Present Illness:     Adult Annual Physical   Patient here for a comprehensive physical exam  The patient reports no problems  Pt has not yet done previously ordered FBW (reprinted for him today) and has not yet seen GI  Pt is vaccinated against COV-19; Tdap in 2020  Pt declines Flu Vaccine today  Diet and Physical Activity  · Diet/Nutrition: limited junk food  · Exercise: 3-4 times a week on average  Back on the exercise, and regular exercise  Depression Screening  PHQ-2/9 Depression Screening    Little interest or pleasure in doing things: 0 - not at all  Feeling down, depressed, or hopeless: 0 - not at all  PHQ-2 Score: 0  PHQ-2 Interpretation: Negative depression screen       General Health  · Sleep: sleeps well  · Hearing: normal - bilateral   · Vision: no vision problems  · Dental: regular dental visits  Had recent tooth extraction   Health  · Symptoms include: none     Review of Systems:     Review of Systems   Constitutional: Negative for activity change  Respiratory: Negative for shortness of breath  Cardiovascular: Negative for chest pain  Gastrointestinal: Negative for abdominal pain and blood in stool  Genitourinary: Negative for decreased urine volume and difficulty urinating  Psychiatric/Behavioral: Negative for dysphoric mood  The patient is not nervous/anxious  Past Medical History:     History reviewed  No pertinent past medical history  Past Surgical History:     History reviewed  No pertinent surgical history     Family History:     Family History   Problem Relation Age of Onset   • Diabetes Mother    • Hypertension Father         Benign essential   • Hypertension Brother       Social History:     Social History     Socioeconomic History   • Marital status:      Spouse name: None   • Number of children: 1   • Years of education: College Grad   • Highest education level: None   Occupational History   • Occupation:    Tobacco Use   • Smoking status: Former Smoker     Quit date:      Years since quittin 7   • Smokeless tobacco: Never Used   Vaping Use   • Vaping Use: Never used   Substance and Sexual Activity   • Alcohol use: No   • Drug use: No   • Sexual activity: Yes     Partners: Female   Other Topics Concern   • None   Social History Narrative    1 step-child, 1 child     Social Determinants of Health     Financial Resource Strain: Not on file   Food Insecurity: Not on file   Transportation Needs: Not on file   Physical Activity: Not on file   Stress: Not on file   Social Connections: Not on file   Intimate Partner Violence: Not on file   Housing Stability: Not on file      Current Medications:     Current Outpatient Medications   Medication Sig Dispense Refill   • lisinopril (ZESTRIL) 5 mg tablet Take 1 tablet (5 mg total) by mouth daily 90 tablet 3   • multivitamin (THERAGRAN) TABS Take 1 tablet by mouth daily     • Omega-3 Fatty Acids (FISH OIL) 1200 MG CAPS Take by mouth     • fluticasone (FLONASE) 50 mcg/act nasal spray 2 sprays into each nostril daily (Patient not taking: No sig reported) 9 9 mL 1     No current facility-administered medications for this visit  Allergies: Allergies   Allergen Reactions   • Seasonal Ic [Cholestatin]       Physical Exam:     /92 (BP Location: Left arm, Patient Position: Sitting, Cuff Size: Large)   Pulse 88   Temp 97 8 °F (36 6 °C) (Tympanic)   Resp 14   Ht 5' 10" (1 778 m)   Wt 113 kg (248 lb 9 6 oz)   SpO2 97%   BMI 35 67 kg/m²     Physical Exam  Vitals and nursing note reviewed  Constitutional:       General: He is not in acute distress  Appearance: Normal appearance   He is not ill-appearing, toxic-appearing or diaphoretic  HENT:      Head: Normocephalic and atraumatic  Eyes:      General: No scleral icterus  Conjunctiva/sclera: Conjunctivae normal    Cardiovascular:      Rate and Rhythm: Normal rate and regular rhythm  Heart sounds: Normal heart sounds  No murmur heard  No friction rub  No gallop  Pulmonary:      Effort: Pulmonary effort is normal  No respiratory distress  Breath sounds: Normal breath sounds  No stridor  No wheezing, rhonchi or rales  Abdominal:      General: Abdomen is flat  Bowel sounds are normal  There is no distension  Palpations: Abdomen is soft  There is no mass  Tenderness: There is no abdominal tenderness  There is no guarding or rebound  Genitourinary:     Prostate: Normal       Rectum: Normal       Comments: No gross blood on the examining  Musculoskeletal:      Cervical back: Normal range of motion and neck supple  No rigidity or tenderness  Lymphadenopathy:      Cervical: No cervical adenopathy  Neurological:      Mental Status: He is alert and oriented to person, place, and time  Psychiatric:         Mood and Affect: Mood normal          Behavior: Behavior normal          Thought Content: Thought content normal          Judgment: Judgment normal           Norma Degroot was seen today for physical exam     Diagnoses and all orders for this visit:    Annual physical exam  Comments:  Jose Ramon Patel is stable on exam   He is to continue a healthy, lower carb/salt diet, and get regular exercise  FBW slips reprinted for him  Essential hypertension  Comments:  Reasonably controlled on present management - diet and exercise as above  Screening for colon cancer  Comments:  Pt referred to GI again today  Orders:  -     Ambulatory Referral to Gastroenterology; Future    BMI 35 0-35 9,adult  Comments:  Diet and exercise as above            [address] Sorto, [facility]

## 2022-10-12 NOTE — PATIENT INSTRUCTIONS

## 2022-12-28 ENCOUNTER — OFFICE VISIT (OUTPATIENT)
Dept: URGENT CARE | Facility: CLINIC | Age: 52
End: 2022-12-28

## 2022-12-28 ENCOUNTER — APPOINTMENT (OUTPATIENT)
Dept: RADIOLOGY | Facility: CLINIC | Age: 52
End: 2022-12-28

## 2022-12-28 VITALS — DIASTOLIC BLOOD PRESSURE: 93 MMHG | RESPIRATION RATE: 16 BRPM | SYSTOLIC BLOOD PRESSURE: 157 MMHG | HEART RATE: 99 BPM

## 2022-12-28 DIAGNOSIS — M25.512 ACUTE PAIN OF LEFT SHOULDER: Primary | ICD-10-CM

## 2022-12-28 DIAGNOSIS — S43.402A SPRAIN OF LEFT SHOULDER, UNSPECIFIED SHOULDER SPRAIN TYPE, INITIAL ENCOUNTER: ICD-10-CM

## 2022-12-28 DIAGNOSIS — M25.512 ACUTE PAIN OF LEFT SHOULDER: ICD-10-CM

## 2022-12-28 NOTE — PROGRESS NOTES
[address] [facility]        NAME: Juanjo Noel is a 46 y o  male  : 1970    MRN: [id number]  DATE: 2022  TIME: 10:43 AM    Assessment and Plan   Acute pain of left shoulder [M25 512]  1  Acute pain of left shoulder  XR shoulder 2+ vw left      2  Sprain of left shoulder, unspecified shoulder sprain type, initial encounter  Ambulatory Referral to Orthopedic Surgery      X-rays reviewed, no abnormality noted, awaiting official read  Continue Tylenol and topicals such as lidocaine or Biofreeze as needed for pain  Trial heat for muscle tightness around neck  Follow-up with orthopedics or primary care provider if symptoms do not resolve within 1 to 2 weeks  Patient Instructions   Shoulder Sprain   WHAT YOU NEED TO KNOW:   A shoulder sprain happens when a ligament in your shoulder is stretched or torn  Ligaments are the tough tissues that connect bones  Ligaments allow you to lift, lower, and rotate your arm         DISCHARGE INSTRUCTIONS:   Return to the emergency department if:   • You feel severe pain in your shoulder when you move it, or it is touched      • Your skin feels cold or clammy      • You have numbness, tingling, or a feeling of pins and needles in your shoulder       • The skin on your injured shoulder looks blue or pale      Call your doctor if:   • You have new or increased swelling and pain in your shoulder      • You have new or increased stiffness when you move your injured shoulder      • Your symptoms do not improve within 5 to 7 days       • You have questions or concerns about your condition or care      Medicines: You may need any of the following:  • Acetaminophen  decreases pain and fever  It is available without a doctor's order  Ask how much to take and how often to take it  Follow directions   Read the labels of all other medicines you are using to see if they also contain acetaminophen, or ask your doctor or pharmacist  Acetaminophen can cause liver damage if not taken correctly  Do not use more than 4 grams (4,000 milligrams) total of acetaminophen in one day       • NSAIDs , such as ibuprofen, help decrease swelling, pain, and fever  This medicine is available with or without a doctor's order  NSAIDs can cause stomach bleeding or kidney problems in certain people  If you take blood thinner medicine, always ask your healthcare provider if NSAIDs are safe for you  Always read the medicine label and follow directions      • Prescription pain medicine  may be given  Ask your healthcare provider how to take this medicine safely  Some prescription pain medicines contain acetaminophen  Do not take other medicines that contain acetaminophen without talking to your healthcare provider  Too much acetaminophen may cause liver damage  Prescription pain medicine may cause constipation  Ask your healthcare provider how to prevent or treat constipation       • Take your medicine as directed  Contact your healthcare provider if you think your medicine is not helping or if you have side effects  Tell him or her if you are allergic to any medicine  Keep a list of the medicines, vitamins, and herbs you take  Include the amounts, and when and why you take them  Bring the list or the pill bottles to follow-up visits  Carry your medicine list with you in case of an emergency      Follow up with your doctor as directed:  Write down your questions so you remember to ask them during your visits  Self-care:   • Rest  your shoulder so it can heal  Avoid moving your shoulder as your injury heals  This will help decrease the risk of more damage to your shoulder      • Apply ice  on your shoulder for 20 to 30 minutes every 2 hours or as directed  Use an ice pack, or put crushed ice in a plastic bag  Cover it with a towel before you apply it to your shoulder  Ice helps prevent tissue damage and decreases swelling and pain      • Compress your shoulder as directed   Compression provides support and helps decrease swelling and movement so your shoulder can heal  For mild sprains, you may be given a sling to support your arm  You may need a padded brace or a plaster cast to hold your shoulder in place if the sprain is more serious      How to wear a brace, sling, or splint:  A brace, sling, or splint may be needed to limit your movement and protect your injured shoulder  • Wear your brace, sling, or splint as directed  You may need to wear it all the time and take it off only to bathe or do exercises  Ask your healthcare provider how long you should wear it      • Keep your skin clean and dry  Padding under your armpit will help absorb sweat and prevent sores on your skin      • Do not hunch your shoulders  This may cause pain  Keep your shoulders relaxed      • Position the sling over your arm and hand so that it also covers your knuckles  This will help the sling support your wrist and hand  Position your wrist higher than your elbow  Your wrist may start to hurt or go numb if your sling is too short      Physical therapy:  A physical therapist teaches you exercises to help improve movement and strength, and to decrease pain  Prevent another injury:   • Do not exercise when you are tired or in pain  Warm up and stretch before you exercise      • Wear equipment to protect yourself when you play sports      • Wear shoes that fit well and run on flat surfaces to prevent falls      © Copyright [employer] 2022 Information is for End User's use only and may not be sold, redistributed or otherwise used for commercial purposes  All illustrations and images included in CareNotes® are the copyrighted property of [employer]  or [facility] Ilan Ferrera   The above information is an  only  It is not intended as medical advice for individual conditions or treatments  Talk to your doctor, nurse or pharmacist before following any medical regimen to see if it is safe and effective for you      Exercises for Shoulder Flexion and Extension   WHAT YOU NEED TO KNOW:   Shoulder flexion and extension exercises work the muscles in your upper back  DISCHARGE INSTRUCTIONS:   Contact your healthcare provider if:   • You have sharp or worsening pain during exercise or at rest      • You have questions or concerns about your shoulder exercises      Before you exercise:  Warm up and stretch before you exercise  Walk or ride a stationary bike for 5 to 10 minutes to help you warm up  Stretching helps increase range of motion  It may also decrease muscle soreness and help prevent another injury  Your healthcare provider will tell you which of the following stretches to do:  • Crossover arm stretch:  Relax your shoulders  Hold your upper arm with the opposite hand  Pull your arm across your chest until you feel a stretch  Hold the stretch for 30 seconds  Return to the starting position           • Shoulder flexion stretch:  Stand facing a wall  Slowly walk your fingers up the wall until you feel a stretch  Hold for 30 seconds  Return to the starting position           • Sleeper stretch:  Lie on your side on a firm, flat surface with your injured arm tucked under you  Place your head on a pillow for comfort  Bend the elbow of your injured arm 90°  Use your arm that is not injured to slowly push your injured arm down  Stop when you feel a stretch at the back of your injured shoulder  Hold for 30 seconds  Slowly return to the starting position         How to exercise with a weight:  Your healthcare provider will tell you how much weight to exercise with  • Shoulder extension:  Lie on a hard table on your stomach  Let your arms hang off the side  Hold a weight in both hands with your palms facing toward your body  Keep your arms straight and slowly raise your arms parallel to the floor in a "Y" shape  Stop when your arms are level with your body  Hold for as long as directed   Slowly return to the starting position       • Shoulder flexion:  Stand and hold a weight in the hand of your injured shoulder  Keep your arm straight and slowly raise your arm over your head as far as you can without pain  Do not raise your arm over your head unless your healthcare provider says it is okay  Do not let your shoulder shrug  Hold this position for as many seconds as directed  Slowly return to the starting position      How to exercise with an exercise band:   • Shoulder extension:  Wrap the exercise band around a heavy, stable object  The band should be level with your chest  Stand and hold each end of the band in both hands  Step back and extend your arms straight  Squeeze your shoulder blades together and pull your arms back and down  Hold for as long as directed  Slowly return to the starting position       • Shoulder flexion:  Wrap the exercise band around a heavy, stable object near your foot  Grab the band with the hand of your injured shoulder  Keep your arm straight  Slowly pull the band up and past your head as far as you can without pain  Do not raise your arm over your head unless your healthcare provider says it is okay  Do not let your shoulder shrug  Hold this position for as many seconds as directed  Slowly return to the starting position      Follow up with your physical therapist as directed:  Write down your questions so you remember to ask them during your visits  © Copyright [employer] 2022 Information is for End User's use only and may not be sold, redistributed or otherwise used for commercial purposes  All illustrations and images included in CareNotes® are the copyrighted property of [employer]  or Mahad Ferrera   The above information is an  only  It is not intended as medical advice for individual conditions or treatments  Talk to your doctor, nurse or pharmacist before following any medical regimen to see if it is safe and effective for you             Follow up with PCP in 3-5 days    Proceed to  ER if symptoms worsen  Chief Complaint     Chief Complaint   Patient presents with   • Shoulder Pain     Left shoulder pain, x1 week, was improving, worsening today with limited ROM and tenderness to the touch, constant pain  History of Present Illness       Patient is a 80-year-old male past medical history significant for diabetes, hypertension, who presents for evaluation of left shoulder pain which began last week  The pain was worse around [place] and then seemed to improve, but today he awoke with significant tenderness and pain with range of motion  He notes the tenderness is in the anterior shoulder and bicep area, and he has been noticing tenderness around the left triceps and neck as well  The only new activity he recently performed was lifting a 50 pound bag of salt last week, but denies any known injury or trauma  He took 1 Tylenol with little relief  He rates the pain as a 7 out of 10 with movement  He denies numbness, tingling, swelling, discoloration, body aches  Shoulder Pain   Pertinent negatives include no fever or numbness  Review of Systems   Review of Systems   Constitutional: Negative for fatigue and fever  HENT: Negative for congestion, ear discharge, ear pain, postnasal drip, rhinorrhea, sinus pressure, sinus pain, sneezing and sore throat  Eyes: Negative  Negative for pain, discharge, redness and itching  Respiratory: Negative  Negative for apnea, cough, choking, chest tightness, shortness of breath, wheezing and stridor  Cardiovascular: Negative  Negative for chest pain and palpitations  Gastrointestinal: Negative  Negative for diarrhea, nausea and vomiting  Endocrine: Negative  Negative for polydipsia, polyphagia and polyuria  Genitourinary: Negative  Negative for decreased urine volume and flank pain  Musculoskeletal: Positive for arthralgias and myalgias  Negative for back pain, gait problem, joint swelling, neck pain and neck stiffness  Skin: Negative  Negative for color change and rash  Allergic/Immunologic: Negative  Negative for environmental allergies  Neurological: Negative  Negative for dizziness, facial asymmetry, light-headedness, numbness and headaches  Hematological: Negative  Negative for adenopathy  Psychiatric/Behavioral: Negative  All other systems reviewed and are negative  Current Medications       Current Outpatient Medications:   •  lisinopril (ZESTRIL) 5 mg tablet, Take 1 tablet (5 mg total) by mouth daily, Disp: 90 tablet, Rfl: 3  •  multivitamin (THERAGRAN) TABS, Take 1 tablet by mouth daily, Disp: , Rfl:   •  Omega-3 Fatty Acids (FISH OIL) 1200 MG CAPS, Take by mouth, Disp: , Rfl:   •  fluticasone (FLONASE) 50 mcg/act nasal spray, 2 sprays into each nostril daily (Patient not taking: Reported on 2/1/2022), Disp: 9 9 mL, Rfl: 1    Current Allergies     Allergies as of 12/28/2022 - Reviewed 12/28/2022   Allergen Reaction Noted   • Seasonal ic [cholestatin]  10/23/2019            The following portions of the patient's history were reviewed and updated as appropriate: allergies, current medications, past family history, past medical history, past social history, past surgical history and problem list      No past medical history on file  No past surgical history on file  Family History   Problem Relation Age of Onset   • Diabetes Mother    • Hypertension Father         Benign essential   • Hypertension Brother          Medications have been verified  Objective   /93   Pulse 99   Resp 16        Physical Exam     Physical Exam  Vitals reviewed  Constitutional:       General: He is not in acute distress  Appearance: Normal appearance  He is not ill-appearing, toxic-appearing or diaphoretic  Interventions: He is not intubated  HENT:      Head: Normocephalic and atraumatic  Right Ear: Tympanic membrane, ear canal and external ear normal  There is no impacted cerumen  Left Ear: Tympanic membrane, ear canal and external ear normal  There is no impacted cerumen  Nose: Nose normal  No congestion or rhinorrhea  Mouth/Throat:      Mouth: Mucous membranes are moist       Pharynx: Oropharynx is clear  Uvula midline  No pharyngeal swelling, oropharyngeal exudate, posterior oropharyngeal erythema or uvula swelling  Tonsils: No tonsillar exudate or tonsillar abscesses  1+ on the right  1+ on the left  Eyes:      Extraocular Movements: Extraocular movements intact  Conjunctiva/sclera: Conjunctivae normal       Pupils: Pupils are equal, round, and reactive to light  Cardiovascular:      Rate and Rhythm: Normal rate and regular rhythm  Pulses: Normal pulses  Heart sounds: Normal heart sounds, S1 normal and S2 normal  Heart sounds not distant  No murmur heard  No friction rub  No gallop  Pulmonary:      Effort: Pulmonary effort is normal  No tachypnea, bradypnea, accessory muscle usage, prolonged expiration, respiratory distress or retractions  He is not intubated  Breath sounds: Normal breath sounds  No stridor, decreased air movement or transmitted upper airway sounds  No decreased breath sounds, wheezing, rhonchi or rales  Chest:      Chest wall: No tenderness  Musculoskeletal:      Left shoulder: Tenderness present  No swelling, deformity, effusion, laceration, bony tenderness or crepitus  Decreased range of motion  Normal strength  Normal pulse  Cervical back: Normal range of motion and neck supple  No rigidity or tenderness  Comments: Positive empty can test, negative drop arm test, good strength with belly press, good strength with resistance to internal and external opposition  Extension of shoulder possible up to about 90 degrees with significant discomfort, internal rotation minimal due to pain  Full abduction and adduction   Lymphadenopathy:      Cervical: No cervical adenopathy  Skin:     General: Skin is warm and dry  Capillary Refill: Capillary refill takes less than 2 seconds  Findings: No erythema  Neurological:      General: No focal deficit present  Mental Status: He is alert     Psychiatric:         Mood and Affect: Mood normal

## 2023-03-13 ENCOUNTER — CONSULT (OUTPATIENT)
Dept: GASTROENTEROLOGY | Facility: AMBULARY SURGERY CENTER | Age: 53
End: 2023-03-13

## 2023-03-13 VITALS
HEIGHT: 70 IN | WEIGHT: 244.8 LBS | SYSTOLIC BLOOD PRESSURE: 156 MMHG | HEART RATE: 76 BPM | DIASTOLIC BLOOD PRESSURE: 84 MMHG | OXYGEN SATURATION: 99 % | BODY MASS INDEX: 35.05 KG/M2

## 2023-03-13 DIAGNOSIS — Z83.71 FAMILY HISTORY OF COLONIC POLYPS: ICD-10-CM

## 2023-03-13 DIAGNOSIS — Z12.11 SCREEN FOR COLON CANCER: Primary | ICD-10-CM

## 2023-03-13 PROBLEM — Z83.719 FAMILY HISTORY OF COLONIC POLYPS: Status: ACTIVE | Noted: 2023-03-13

## 2023-03-13 RX ORDER — SODIUM PICOSULFATE, MAGNESIUM OXIDE, AND ANHYDROUS CITRIC ACID 10; 3.5; 12 MG/160ML; G/160ML; G/160ML
1 LIQUID ORAL SEE ADMIN INSTRUCTIONS
Qty: 160 ML | Refills: 0 | Status: SHIPPED | OUTPATIENT
Start: 2023-03-13

## 2023-03-13 NOTE — ASSESSMENT & PLAN NOTE
Patient is at increased risks because of family history of colon polyps in his father  Rule out colorectal lesions including polyps or malignancy   -Schedule for colonoscopy  -High-fiber diet     -Patient was given instructions about the colonoscopy prep     -Patient was explained about the risks and benefits of the procedure  Risks including but not limited to bleeding, infection, perforation were explained in detail  Also explained about less than 100% sensitivity with the exam and other alternatives

## 2023-03-13 NOTE — PROGRESS NOTES
Consultation - [facility]  [place]ress[place] Madeline 1970 male         Chief Complaint: For colonoscopy    HPI: 51-year-old male with history of hypertension was referred for screening colonoscopy  Patient never had colonoscopy in the past   Patient has regular bowel movements and denies any blood or mucus in the stool  Appetite is good and denies any recent weight loss  Denies any abdominal pain, nausea, or vomiting  Has no heartburn or acid reflux  Denies any difficulty swallowing  His father had colon polyps    Chaperon: Ms Guadarrama Sing: Review of Systems   Constitutional: Negative for activity change, appetite change, chills, diaphoresis, fatigue, fever and unexpected weight change  HENT: Negative for ear discharge, ear pain, facial swelling, hearing loss, nosebleeds, sore throat, tinnitus and voice change  Eyes: Negative for pain, discharge, redness, itching and visual disturbance  Respiratory: Negative for apnea, cough, chest tightness, shortness of breath and wheezing  Cardiovascular: Negative for chest pain and palpitations  Gastrointestinal:        As noted in HPI   Endocrine: Negative for cold intolerance, heat intolerance and polyuria  Genitourinary: Negative for difficulty urinating, dysuria, flank pain, hematuria and urgency  Musculoskeletal: Positive for arthralgias  Negative for back pain, gait problem, joint swelling and myalgias  Skin: Negative for rash and wound  Neurological: Negative for dizziness, tremors, seizures, speech difficulty, light-headedness, numbness and headaches  Hematological: Negative for adenopathy  Does not bruise/bleed easily  Psychiatric/Behavioral: Negative for agitation, behavioral problems and confusion  The patient is not nervous/anxious  History reviewed  No pertinent past medical history  History reviewed  No pertinent surgical history    Social History     Socioeconomic History   • Marital status:      Spouse name: Not on file   • Number of children: 1   • Years of education: College Grad   • Highest education level: Not on file   Occupational History   • Occupation:    Tobacco Use   • Smoking status: Former     Types: Cigarettes     Quit date:      Years since quittin 2   • Smokeless tobacco: Never   Vaping Use   • Vaping Use: Never used   Substance and Sexual Activity   • Alcohol use: No   • Drug use: No   • Sexual activity: Yes     Partners: Female   Other Topics Concern   • Not on file   Social History Narrative    1 step-child, 1 child     Social Determinants of Health     Financial Resource Strain: Not on file   Food Insecurity: Not on file   Transportation Needs: Not on file   Physical Activity: Not on file   Stress: Not on file   Social Connections: Not on file   Intimate Partner Violence: Not on file   Housing Stability: Not on file     Family History   Problem Relation Age of Onset   • Diabetes Mother    • Hypertension Father         Benign essential   • Hypertension Brother      Seasonal ic [cholestatin]  Current Outpatient Medications   Medication Sig Dispense Refill   • lisinopril (ZESTRIL) 5 mg tablet Take 1 tablet (5 mg total) by mouth daily 30 tablet 0   • multivitamin (THERAGRAN) TABS Take 1 tablet by mouth daily     • Omega-3 Fatty Acids (FISH OIL) 1200 MG CAPS Take by mouth     • sodium picosulfate, magnesium oxide, citric acid (Clenpiq) 10-3 5-12 MG-GM -GM/160ML SOLN Take 1 Package by mouth see administration instructions 160 mL 0   • fluticasone (FLONASE) 50 mcg/act nasal spray 2 sprays into each nostril daily (Patient not taking: Reported on 2022) 9 9 mL 1     No current facility-administered medications for this visit  Blood pressure 156/84, pulse 76, height 5' 10" (1 778 m), weight 111 kg (244 lb 12 8 oz), SpO2 99 %  PHYSICAL EXAM: Physical Exam  Constitutional:       Appearance: He is well-developed     HENT:      Head: Normocephalic and atraumatic  Eyes:      General: No scleral icterus  Right eye: No discharge  Left eye: No discharge  Conjunctiva/sclera: Conjunctivae normal       Pupils: Pupils are equal, round, and reactive to light  Neck:      Thyroid: No thyromegaly  Vascular: No JVD  Trachea: No tracheal deviation  Cardiovascular:      Rate and Rhythm: Normal rate and regular rhythm  Heart sounds: Normal heart sounds  No murmur heard  No friction rub  No gallop  Pulmonary:      Effort: Pulmonary effort is normal  No respiratory distress  Breath sounds: Normal breath sounds  No wheezing or rales  Chest:      Chest wall: No tenderness  Abdominal:      General: Bowel sounds are normal  There is no distension  Palpations: Abdomen is soft  There is no mass  Tenderness: There is no abdominal tenderness  There is no guarding or rebound  Hernia: No hernia is present  Musculoskeletal:      Cervical back: Neck supple  Lymphadenopathy:      Cervical: No cervical adenopathy  Skin:     General: Skin is warm and dry  Findings: No erythema or rash  Neurological:      Mental Status: He is alert and oriented to person, place, and time  Psychiatric:         Behavior: Behavior normal          Thought Content: Thought content normal           Lab Results   Component Value Date    WBC 8 6 09/04/2020    HGB 16 9 09/04/2020    HCT 50 4 09/04/2020    MCV 88 09/04/2020     09/04/2020     Lab Results   Component Value Date    GLUCOSE 106 (H) 04/05/2017    CALCIUM 9 5 04/05/2017     04/05/2017    K 5 2 09/04/2020    CO2 25 09/04/2020     09/04/2020    BUN 13 09/04/2020    CREATININE 0 91 09/04/2020     Lab Results   Component Value Date    ALT 43 09/04/2020    AST 24 09/04/2020    ALKPHOS 88 04/05/2017    BILITOT 0 4 04/05/2017     No results found for: INR, PROTIME    XR foot 3+ vw right    Result Date: 2/2/2017  Impression: No acute osseous abnormality   Workstation performed: [id number]     XR heel / calcaneus 2+ vw right    Result Date: 2/2/2017  Impression: No acute osseous abnormality  Workstation performed: [id number]       ASSESSMENT & PLAN:    Screen for colon cancer  Patient is at increased risks because of family history of colon polyps in his father  Rule out colorectal lesions including polyps or malignancy   -Schedule for colonoscopy  -High-fiber diet     -Patient was given instructions about the colonoscopy prep     -Patient was explained about the risks and benefits of the procedure  Risks including but not limited to bleeding, infection, perforation were explained in detail  Also explained about less than 100% sensitivity with the exam and other alternatives        Family history of colonic polyps    -Colonoscopy

## 2023-03-13 NOTE — PATIENT INSTRUCTIONS
Scheduled date of colonoscopy (as of today):  05/17/23  Physician performing colonoscopy:  dr Marylee Price  Location of colonoscopy:  [facility]  Bowel prep reviewed with patient:  ned  Instructions reviewed with patient by:  patricia denney  Clearances:  n a

## 2023-04-10 DIAGNOSIS — Z13.1 SCREENING FOR DIABETES MELLITUS: Primary | ICD-10-CM

## 2023-04-10 DIAGNOSIS — E53.8 VITAMIN B12 DEFICIENCY: ICD-10-CM

## 2023-04-10 DIAGNOSIS — E55.9 VITAMIN D DEFICIENCY: ICD-10-CM

## 2023-04-10 DIAGNOSIS — Z12.5 SCREENING FOR PROSTATE CANCER: ICD-10-CM

## 2023-04-10 DIAGNOSIS — Z13.6 SCREENING FOR CARDIOVASCULAR CONDITION: ICD-10-CM

## 2023-04-10 DIAGNOSIS — R53.83 OTHER FATIGUE: ICD-10-CM

## 2023-04-10 DIAGNOSIS — Z11.59 NEED FOR HEPATITIS C SCREENING TEST: ICD-10-CM

## 2023-04-12 DIAGNOSIS — L72.9 SCROTAL CYST: Primary | ICD-10-CM

## 2023-04-15 DIAGNOSIS — E55.9 VITAMIN D DEFICIENCY: Primary | ICD-10-CM

## 2023-04-15 RX ORDER — ERGOCALCIFEROL 1.25 MG/1
50000 CAPSULE ORAL WEEKLY
Qty: 8 CAPSULE | Refills: 0 | Status: SHIPPED | OUTPATIENT
Start: 2023-04-15

## 2023-04-25 DIAGNOSIS — I10 ESSENTIAL HYPERTENSION: ICD-10-CM

## 2023-04-25 RX ORDER — LISINOPRIL 5 MG/1
TABLET ORAL
Qty: 30 TABLET | Refills: 0 | Status: SHIPPED | OUTPATIENT
Start: 2023-04-25

## 2023-05-12 PROBLEM — Z12.11 SCREEN FOR COLON CANCER: Status: RESOLVED | Noted: 2023-03-13 | Resolved: 2023-05-12

## 2023-05-17 ENCOUNTER — HOSPITAL ENCOUNTER (OUTPATIENT)
Dept: GASTROENTEROLOGY | Facility: AMBULARY SURGERY CENTER | Age: 53
Setting detail: OUTPATIENT SURGERY
Discharge: HOME | End: 2023-05-17
Attending: INTERNAL MEDICINE | Admitting: INTERNAL MEDICINE

## 2023-05-17 ENCOUNTER — ANESTHESIA (OUTPATIENT)
Dept: GASTROENTEROLOGY | Facility: AMBULARY SURGERY CENTER | Age: 53
End: 2023-05-17

## 2023-05-17 ENCOUNTER — ANESTHESIA EVENT (OUTPATIENT)
Dept: GASTROENTEROLOGY | Facility: AMBULARY SURGERY CENTER | Age: 53
End: 2023-05-17

## 2023-05-17 VITALS
HEIGHT: 71 IN | HEART RATE: 92 BPM | TEMPERATURE: 97.2 F | SYSTOLIC BLOOD PRESSURE: 107 MMHG | BODY MASS INDEX: 33.32 KG/M2 | OXYGEN SATURATION: 98 % | WEIGHT: 238 LBS | DIASTOLIC BLOOD PRESSURE: 70 MMHG | RESPIRATION RATE: 18 BRPM

## 2023-05-17 DIAGNOSIS — Z83.71 FAMILY HISTORY OF COLONIC POLYPS: ICD-10-CM

## 2023-05-17 DIAGNOSIS — Z12.11 SCREEN FOR COLON CANCER: ICD-10-CM

## 2023-05-17 RX ORDER — PROPOFOL 10 MG/ML
INJECTION, EMULSION INTRAVENOUS AS NEEDED
Status: DISCONTINUED | OUTPATIENT
Start: 2023-05-17 | End: 2023-05-17

## 2023-05-17 RX ORDER — SODIUM CHLORIDE, SODIUM LACTATE, POTASSIUM CHLORIDE, CALCIUM CHLORIDE 600; 310; 30; 20 MG/100ML; MG/100ML; MG/100ML; MG/100ML
INJECTION, SOLUTION INTRAVENOUS CONTINUOUS PRN
Status: DISCONTINUED | OUTPATIENT
Start: 2023-05-17 | End: 2023-05-17

## 2023-05-17 RX ORDER — SODIUM CHLORIDE, SODIUM LACTATE, POTASSIUM CHLORIDE, CALCIUM CHLORIDE 600; 310; 30; 20 MG/100ML; MG/100ML; MG/100ML; MG/100ML
125 INJECTION, SOLUTION INTRAVENOUS CONTINUOUS
Status: CANCELLED | OUTPATIENT
Start: 2023-05-17

## 2023-05-17 RX ORDER — LIDOCAINE HYDROCHLORIDE 10 MG/ML
0.5 INJECTION, SOLUTION EPIDURAL; INFILTRATION; INTRACAUDAL; PERINEURAL ONCE AS NEEDED
Status: CANCELLED | OUTPATIENT
Start: 2023-05-17

## 2023-05-17 RX ORDER — LIDOCAINE HYDROCHLORIDE 10 MG/ML
INJECTION, SOLUTION EPIDURAL; INFILTRATION; INTRACAUDAL; PERINEURAL AS NEEDED
Status: DISCONTINUED | OUTPATIENT
Start: 2023-05-17 | End: 2023-05-17

## 2023-05-17 RX ADMIN — PROPOFOL 100 MG: 10 INJECTION, EMULSION INTRAVENOUS at 12:44

## 2023-05-17 RX ADMIN — PROPOFOL 50 MG: 10 INJECTION, EMULSION INTRAVENOUS at 12:49

## 2023-05-17 RX ADMIN — PROPOFOL 50 MG: 10 INJECTION, EMULSION INTRAVENOUS at 12:47

## 2023-05-17 RX ADMIN — LIDOCAINE HYDROCHLORIDE 50 MG: 10 INJECTION, SOLUTION EPIDURAL; INFILTRATION; INTRACAUDAL at 12:44

## 2023-05-17 RX ADMIN — PROPOFOL 50 MG: 10 INJECTION, EMULSION INTRAVENOUS at 12:45

## 2023-05-17 RX ADMIN — SODIUM CHLORIDE, SODIUM LACTATE, POTASSIUM CHLORIDE, AND CALCIUM CHLORIDE: .6; .31; .03; .02 INJECTION, SOLUTION INTRAVENOUS at 12:41

## 2023-05-17 NOTE — ANESTHESIA PREPROCEDURE EVALUATION
Procedure:  COLONOSCOPY  screening  Relevant Problems   CARDIO   (+) Essential hypertension      Other   (+) Family history of colonic polyps        Physical Exam    Airway    Mallampati score: II  TM Distance: >3 FB  Neck ROM: full     Dental   No notable dental hx     Cardiovascular      Pulmonary  Pulmonary exam normal     Other Findings        Anesthesia Plan  ASA Score- 2     Anesthesia Type- IV sedation with anesthesia with ASA Monitors  Additional Monitors:   Airway Plan:     Comment: Supplemental O2, etco2 monitoring    Plan Factors-Exercise tolerance (METS): >4 METS  Chart reviewed  Patient summary reviewed  Induction- intravenous  Postoperative Plan-     Informed Consent- Anesthetic plan and risks discussed with patient  I personally reviewed this patient with the CRNA  Discussed and agreed on the Anesthesia Plan with the CRNA  Gianna Spann

## 2023-05-17 NOTE — H&P
"History and Physical -  [facility]  Mihir lOga  48 y o  male MRN: [id number]        HPI: 55-year-old male was referred for screening colonoscopy  Regular bowel movements  His father had colon polyps  Historical Information   History reviewed  No pertinent past medical history  History reviewed  No pertinent surgical history  Social History   Social History     Substance and Sexual Activity   Alcohol Use No     Social History     Substance and Sexual Activity   Drug Use No     Social History     Tobacco Use   Smoking Status Former   • Types: Cigarettes   • Quit date:    • Years since quittin 3   Smokeless Tobacco Never     Family History   Problem Relation Age of Onset   • Diabetes Mother    • Hypertension Father         Benign essential   • Hypertension Brother        Meds/Allergies     (Not in a hospital admission)      Allergies   Allergen Reactions   • Seasonal Ic [Cholestatin]        Objective     Blood pressure 143/80, pulse 85, temperature (!) 96 7 °F (35 9 °C), temperature source Temporal, resp  rate 18, height 5' 11\" (1 803 m), weight 108 kg (238 lb), SpO2 98 %      PHYSICAL EXAM:    Gen: NAD  CV: S1 & S2 normal, RRR  CHEST: Clear to auscultate  ABD: soft, NT/ND, good bowel sounds  EXT: no edema    ASSESSMENT:     Screening for colon cancer, family history of colon polyps    PLAN:    Colonoscopy              "

## 2023-05-17 NOTE — ANESTHESIA POSTPROCEDURE EVALUATION
Post-Op Assessment Note    CV Status:  Stable  Pain Score: 0    Pain management: adequate     Mental Status:  Alert and awake   Hydration Status:  Euvolemic   PONV Controlled:  Controlled   Airway Patency:  Patent      Post Op Vitals Reviewed: Yes      Staff: Anesthesiologist, CRNA         There were no known notable events for this encounter      /68 (05/17/23 1259)    Temp (!) 97 2 °F (36 2 °C) (05/17/23 1259)    Pulse 88 (05/17/23 1259)   Resp 18 (05/17/23 1259)    SpO2 97 % (05/17/23 1259)

## 2023-06-18 DIAGNOSIS — I10 ESSENTIAL HYPERTENSION: ICD-10-CM

## 2023-06-18 RX ORDER — LISINOPRIL 5 MG/1
TABLET ORAL
Qty: 30 TABLET | Refills: 0 | Status: SHIPPED | OUTPATIENT
Start: 2023-06-18

## 2023-07-06 DIAGNOSIS — I10 ESSENTIAL HYPERTENSION: ICD-10-CM

## 2023-07-06 RX ORDER — LISINOPRIL 5 MG/1
TABLET ORAL
Qty: 30 TABLET | Refills: 0 | Status: SHIPPED | OUTPATIENT
Start: 2023-07-06

## 2023-08-08 DIAGNOSIS — I10 ESSENTIAL HYPERTENSION: ICD-10-CM

## 2023-08-08 RX ORDER — LISINOPRIL 5 MG/1
TABLET ORAL
Qty: 30 TABLET | Refills: 0 | Status: SHIPPED | OUTPATIENT
Start: 2023-08-08

## 2023-09-15 DIAGNOSIS — I10 ESSENTIAL HYPERTENSION: ICD-10-CM

## 2023-09-15 RX ORDER — LISINOPRIL 5 MG/1
TABLET ORAL
Qty: 30 TABLET | Refills: 0 | Status: SHIPPED | OUTPATIENT
Start: 2023-09-15

## 2023-09-15 NOTE — TELEPHONE ENCOUNTER
Requested medication(s) are due for refill today: Yes  Patient has already received a courtesy refill: No  Other reason request has been forwarded to provider: No protocol attached to medication refill. Please review.

## 2023-10-12 DIAGNOSIS — I10 ESSENTIAL HYPERTENSION: ICD-10-CM

## 2023-10-12 RX ORDER — LISINOPRIL 5 MG/1
TABLET ORAL
Qty: 30 TABLET | Refills: 0 | Status: SHIPPED | OUTPATIENT
Start: 2023-10-12

## 2023-10-18 ENCOUNTER — OFFICE VISIT (OUTPATIENT)
Dept: FAMILY MEDICINE CLINIC | Facility: CLINIC | Age: 53
End: 2023-10-18
Payer: COMMERCIAL

## 2023-10-18 VITALS
TEMPERATURE: 96.8 F | BODY MASS INDEX: 34.61 KG/M2 | WEIGHT: 247.2 LBS | SYSTOLIC BLOOD PRESSURE: 142 MMHG | RESPIRATION RATE: 18 BRPM | DIASTOLIC BLOOD PRESSURE: 92 MMHG | HEIGHT: 71 IN | HEART RATE: 95 BPM | OXYGEN SATURATION: 98 %

## 2023-10-18 DIAGNOSIS — I10 ESSENTIAL HYPERTENSION: ICD-10-CM

## 2023-10-18 DIAGNOSIS — E55.9 VITAMIN D DEFICIENCY: ICD-10-CM

## 2023-10-18 DIAGNOSIS — Z00.00 ANNUAL PHYSICAL EXAM: Primary | ICD-10-CM

## 2023-10-18 DIAGNOSIS — Z11.4 ENCOUNTER FOR SCREENING FOR HIV: ICD-10-CM

## 2023-10-18 DIAGNOSIS — Z13.6 SCREENING FOR CARDIOVASCULAR CONDITION: ICD-10-CM

## 2023-10-18 DIAGNOSIS — Z12.5 SCREENING FOR PROSTATE CANCER: ICD-10-CM

## 2023-10-18 PROCEDURE — 99396 PREV VISIT EST AGE 40-64: CPT | Performed by: FAMILY MEDICINE

## 2023-10-18 NOTE — PROGRESS NOTES
[facility]    NAME: Deysi Koch. AGE: 48 y.o. SEX: male  : 1970     DATE: 10/23/2023     Assessment and Plan:     Problem List Items Addressed This Visit       Essential hypertension     Stable. Continues to use lisinopril 5 mg without any side effects. Monitoring blood pressure at home noted to be below 130/80.  -We will continue lisinopril 5 mg for now and monitor blood pressure and discuss increase if needed.  -Continue low-salt diet, whole food eating.  -Continue exercise for goal of 30 minutes of moderate intensity exercise 5 days/week. -Follow-up in 6 months         Relevant Orders    Comprehensive metabolic panel     Other Visit Diagnoses       Vitamin D deficiency    -  Primary    Relevant Orders    Vitamin D 25 hydroxy    Screening for prostate cancer        Relevant Orders    PSA, Total Screen    Screening for cardiovascular condition        Relevant Orders    Lipid Panel with Direct LDL reflex    Encounter for screening for HIV        Relevant Orders    HIV 1/2 AB/AG w Reflex [facility] for 2 yr old and above            Immunizations and preventive care screenings were discussed with patient today. Appropriate education was printed on patient's after visit summary. Discussed risks and benefits of prostate cancer screening. We discussed the controversial history of PSA screening for prostate cancer in the [facility] as well as the risk of over detection and over treatment of prostate cancer by way of PSA screening. The patient understands that PSA blood testing is an imperfect way to screen for prostate cancer and that elevated PSA levels in the blood may also be caused by infection, inflammation, prostatic trauma or manipulation, urological procedures, or by benign prostatic enlargement.     The role of the digital rectal examination in prostate cancer screening was also discussed and I discussed with him that there is large interobserver variability in the findings of digital rectal examination. Counseling:  Alcohol/drug use: discussed moderation in alcohol intake, the recommendations for healthy alcohol use, and avoidance of illicit drug use. Dental Health: discussed importance of regular tooth brushing, flossing, and dental visits. Injury prevention: discussed safety/seat belts, safety helmets, smoke detectors, carbon dioxide detectors, and smoking near bedding or upholstery. Sexual health: discussed sexually transmitted diseases, partner selection, use of condoms, avoidance of unintended pregnancy, and contraceptive alternatives. Exercise: the importance of regular exercise/physical activity was discussed. Recommend exercise 3-5 times per week for at least 30 minutes. Depression Screening and Follow-up Plan: Patient was screened for depression during today's encounter. They screened negative with a PHQ-2 score of 0. No follow-ups on file. Chief Complaint:     Chief Complaint   Patient presents with    Physical Exam    Care Gap Request     PHQ      History of Present Illness:     Adult Annual Physical   Patient here for a comprehensive physical exam. The patient reports no problems. Diet and Physical Activity  Diet/Nutrition: well balanced diet, limited junk food, and consuming 3-5 servings of fruits/vegetables daily. Exercise: walking and 3-4 times a week on average. Depression Screening  PHQ-2/9 Depression Screening    Little interest or pleasure in doing things: 0 - not at all  Feeling down, depressed, or hopeless: 0 - not at all  PHQ-2 Score: 0  PHQ-2 Interpretation: Negative depression screen       General Health  Sleep: sleeps well and gets 7-8 hours of sleep on average. Hearing: normal - bilateral.  Vision: no vision problems. Dental: regular dental visits and brushes teeth twice daily.         Health  Symptoms include: none    Advanced Care Planning  Do you have an advanced directive? no  Do you have a durable medical power of ? no     Review of Systems:     Review of Systems   Constitutional:  Negative for chills and fever. HENT:  Negative for ear pain and sore throat. Eyes:  Negative for pain and visual disturbance. Respiratory:  Negative for cough and shortness of breath. Cardiovascular:  Negative for chest pain and palpitations. Gastrointestinal:  Negative for abdominal pain and vomiting. Endocrine: Negative for polydipsia and polyuria. Genitourinary:  Negative for dysuria and hematuria. Musculoskeletal:  Negative for arthralgias and back pain. Skin:  Negative for color change and rash. Neurological:  Negative for dizziness, seizures and syncope. Psychiatric/Behavioral:  Negative for confusion. The patient is not nervous/anxious. All other systems reviewed and are negative. Past Medical History:     History reviewed. No pertinent past medical history. Past Surgical History:     History reviewed. No pertinent surgical history.    Family History:     Family History   Problem Relation Age of Onset    Diabetes Mother     Hypertension Father         Benign essential    Hypertension Brother       Social History:     Social History     Socioeconomic History    Marital status:      Spouse name: None    Number of children: 1    Years of education: College Grad    Highest education level: None   Occupational History    Occupation:    Tobacco Use    Smoking status: Former     Types: Cigarettes     Quit date:      Years since quittin.8    Smokeless tobacco: Never   Vaping Use    Vaping Use: Never used   Substance and Sexual Activity    Alcohol use: No    Drug use: No    Sexual activity: Yes     Partners: Female   Other Topics Concern    None   Social History Narrative    1 step-child, 1 child     Social Determinants of Health     Financial Resource Strain: Not on file   Food Insecurity: Not on file Transportation Needs: Not on file   Physical Activity: Not on file   Stress: Not on file   Social Connections: Not on file   Intimate Partner Violence: Not on file   Housing Stability: Not on file      Current Medications:     Current Outpatient Medications   Medication Sig Dispense Refill    lisinopril (ZESTRIL) 5 mg tablet TAKE 1 TABLET BY MOUTH DAILY 30 tablet 0    multivitamin (THERAGRAN) TABS Take 1 tablet by mouth daily      Omega-3 Fatty Acids (FISH OIL) 1200 MG CAPS Take by mouth       No current facility-administered medications for this visit. Allergies: Allergies   Allergen Reactions    Seasonal Ic [Cholestatin]       Physical Exam:     /92 (BP Location: Left arm, Patient Position: Sitting, Cuff Size: Large)   Pulse 95   Temp (!) 96.8 °F (36 °C) (Tympanic)   Resp 18   Ht 5' 11" (1.803 m)   Wt 112 kg (247 lb 3.2 oz)   SpO2 98%   BMI 34.48 kg/m²     Physical Exam  Vitals and nursing note reviewed. Constitutional:       General: He is not in acute distress. Appearance: Normal appearance. HENT:      Head: Normocephalic and atraumatic. Right Ear: Tympanic membrane and external ear normal.      Left Ear: Tympanic membrane and external ear normal.      Nose: Nose normal.      Mouth/Throat:      Mouth: Mucous membranes are moist.   Eyes:      Extraocular Movements: Extraocular movements intact. Conjunctiva/sclera: Conjunctivae normal.      Pupils: Pupils are equal, round, and reactive to light. Cardiovascular:      Rate and Rhythm: Normal rate and regular rhythm. Pulses: Normal pulses. Heart sounds: Normal heart sounds. No murmur heard. Pulmonary:      Effort: Pulmonary effort is normal.      Breath sounds: Normal breath sounds. No wheezing, rhonchi or rales. Abdominal:      General: Bowel sounds are normal.      Palpations: Abdomen is soft. Tenderness: There is no abdominal tenderness. There is no guarding.    Musculoskeletal:         General: Normal range of motion. Cervical back: Normal range of motion. Right lower leg: No edema. Left lower leg: No edema. Lymphadenopathy:      Cervical: No cervical adenopathy. Skin:     General: Skin is warm. Capillary Refill: Capillary refill takes less than 2 seconds. Neurological:      General: No focal deficit present. Mental Status: He is alert and oriented to person, place, and time.    Psychiatric:         Mood and Affect: Mood normal.         Behavior: Behavior normal.          Curley Cabot, DO  [address]

## 2023-10-18 NOTE — PATIENT INSTRUCTIONS
Exercises for Internal and External Shoulder Rotation   WHAT YOU NEED TO KNOW:   What are internal and external shoulder rotation exercises? Exercises for internal shoulder rotation work the muscles in your chest and front of your shoulder. Exercises for external shoulder rotation work the muscles in the back of your shoulder and upper back. What should I do before I exercise? Warm up and stretch before you exercise. Walk or ride a stationary bike for 5 to 10 minutes to help you warm up. Stretching helps increase range of motion. It may also decrease muscle soreness and help prevent another injury. Your healthcare provider will tell you which of the following stretches to do:  Crossover arm stretch:  Relax your shoulders. Hold your upper arm with the opposite hand. Pull your arm across your chest until you feel a stretch. Hold the stretch for 30 seconds. Return to the starting position. Shoulder flexion stretch:  Stand facing a wall. Slowly walk your fingers up the wall until you feel a stretch. Hold the stretch for 30 seconds. Return to the starting position. Sleeper stretch:  Lie on your injured side on a firm, flat surface. Bend the elbow of your injured arm 90° with your hand facing up. Use your arm that is not injured to slowly push your injured arm down. Stop when you feel a stretch at the back of your injured shoulder. Hold the stretch for 30 seconds. Slowly return to the starting position. How do I exercise with a weight? Your healthcare provider or physical therapist will tell you how much weight to use. Shoulder internal rotation:  Sit in a chair. Place a rolled up towel between your elbow and your side. Bend your elbow to 90°. Gently squeeze the towel with your elbow to prevent it from falling out. Hold the weight with your thumb pointing up. Slowly move the weight across your chest. Stop when your hand reaches your opposite arm.  Hold this position for as many seconds as directed. Slowly return to the starting position. Shoulder external rotation:  Lie on your side with your injured shoulder facing up. Bend your elbow 90°. Place a rolled up towel between your elbow and your side. Hold a weight in your hand. Gently squeeze the towel with your elbow to prevent it from falling out. Slowly rotate your arm outward, but keep your elbow bent. Stop when you feel a stretch. Hold this position for 30 seconds or as directed. Slowly return to the starting position. How do I exercise with an exercise band? Your healthcare provider or physical therapist will tell you how much resistance to use. Shoulder internal rotation:  Tie one end of the exercise band to a heavy, secure object. Sit in a chair. Place a rolled up towel between your elbow and your side. Bend your elbow to 90°. Gently squeeze the towel with your elbow to prevent it from falling out. Slowly pull the band across your chest. Stop when your hand reaches your opposite arm. Hold this position for as many seconds as directed. Slowly return to the starting position. Shoulder external rotation:  Hold one end of the exercise band on the side that is not injured. Place a rolled up towel between your elbow and your side. Bend your elbow 90°. Squeeze the towel with your elbow to prevent it from falling out. Grab the end of the band and slowly turn your arm outward, but keep your elbow bent. Stop when you feel a stretch. Hold this position for 30 seconds or as directed. Slowly return to the starting position. When should I call my doctor or physical therapist?   You have sharp or worsening pain during exercise or at rest.    You have questions or concerns about your shoulder exercises. CARE AGREEMENT:   You have the right to help plan your care. Learn about your health condition and how it may be treated. Discuss treatment options with your healthcare providers to decide what care you want to receive.  You always have the right to refuse treatment. The above information is an  only. It is not intended as medical advice for individual conditions or treatments. Talk to your doctor, nurse or pharmacist before following any medical regimen to see if it is safe and effective for you. © Copyright [employer] 2023 Information is for End User's use only and may not be sold, redistributed or otherwise used for commercial purposes. .

## 2023-10-23 NOTE — ASSESSMENT & PLAN NOTE
Stable. Continues to use lisinopril 5 mg without any side effects. Monitoring blood pressure at home noted to be below 130/80.  -We will continue lisinopril 5 mg for now and monitor blood pressure and discuss increase if needed.  -Continue low-salt diet, whole food eating.  -Continue exercise for goal of 30 minutes of moderate intensity exercise 5 days/week.   -Follow-up in 6 months

## 2023-11-09 DIAGNOSIS — I10 ESSENTIAL HYPERTENSION: ICD-10-CM

## 2023-11-10 RX ORDER — LISINOPRIL 5 MG/1
TABLET ORAL
Qty: 30 TABLET | Refills: 0 | Status: SHIPPED | OUTPATIENT
Start: 2023-11-10

## 2023-12-05 DIAGNOSIS — I10 ESSENTIAL HYPERTENSION: ICD-10-CM

## 2023-12-05 RX ORDER — LISINOPRIL 5 MG/1
TABLET ORAL
Qty: 30 TABLET | Refills: 5 | Status: SHIPPED | OUTPATIENT
Start: 2023-12-05

## 2024-04-23 ENCOUNTER — OFFICE VISIT (OUTPATIENT)
Dept: FAMILY MEDICINE CLINIC | Facility: CLINIC | Age: 54
End: 2024-04-23
Payer: COMMERCIAL

## 2024-04-23 VITALS
DIASTOLIC BLOOD PRESSURE: 98 MMHG | WEIGHT: 248.4 LBS | HEART RATE: 94 BPM | OXYGEN SATURATION: 97 % | RESPIRATION RATE: 16 BRPM | SYSTOLIC BLOOD PRESSURE: 140 MMHG | HEIGHT: 71 IN | TEMPERATURE: 97.4 F | BODY MASS INDEX: 34.77 KG/M2

## 2024-04-23 DIAGNOSIS — M54.50 CHRONIC RIGHT-SIDED LOW BACK PAIN WITHOUT SCIATICA: Primary | ICD-10-CM

## 2024-04-23 DIAGNOSIS — M62.830 BACK MUSCLE SPASM: ICD-10-CM

## 2024-04-23 DIAGNOSIS — G89.29 CHRONIC RIGHT-SIDED LOW BACK PAIN WITHOUT SCIATICA: Primary | ICD-10-CM

## 2024-04-23 DIAGNOSIS — I10 ESSENTIAL HYPERTENSION: ICD-10-CM

## 2024-04-23 DIAGNOSIS — M25.562 ACUTE PAIN OF LEFT KNEE: ICD-10-CM

## 2024-04-23 PROCEDURE — 99213 OFFICE O/P EST LOW 20 MIN: CPT | Performed by: FAMILY MEDICINE

## 2024-04-23 RX ORDER — LISINOPRIL 5 MG/1
5 TABLET ORAL DAILY
Qty: 90 TABLET | Refills: 2 | Status: SHIPPED | OUTPATIENT
Start: 2024-04-23

## 2024-04-23 RX ORDER — METHOCARBAMOL 500 MG/1
500 TABLET, FILM COATED ORAL 4 TIMES DAILY
Qty: 30 TABLET | Refills: 0 | Status: SHIPPED | OUTPATIENT
Start: 2024-04-23

## 2024-04-23 NOTE — PROGRESS NOTES
Name: James Berman Jr.      : 1970      MRN: [id number]  Encounter Provider: Shan Dowd DO  Encounter Date: 2024   Encounter department: [facility]    Assessment & Plan     1. Chronic right-sided low back pain without sciatica  Assessment & Plan:  - Off and on for 3 years after straining back during winter shoveling a few years ago.  Does note he has tried some stretching without much relief.  -Denies any red flag symptoms of back pain.  Denies any sciatica.  -Home exercises provided.  -Will trial Robaxin 500 mg 4 times daily as needed for muscle spasm.  -Referral to physical therapy    Orders:  -     Ambulatory Referral to Physical Therapy; Future    2. Essential hypertension  Assessment & Plan:  - Elevated in office today.  Does monitor consistently at home and below 130/80.   -Continue current regimen lisinopril 5 mg p.o. daily  -Continue low-salt, low-carb, low sugar, high-fiber, whole food eating.  -Discussed recommended activity level with a goal of 30 minutes moderate intensity exercise 5 days/week.  -Follow-up in 6 months.      Orders:  -     lisinopril (ZESTRIL) 5 mg tablet; Take 1 tablet (5 mg total) by mouth daily    3. Acute pain of left knee  Assessment & Plan:  - Notes he bumped his knee while moving furniture about 4 weeks ago.  Swelling has gone way down however he feels some tightness in his quadricep.  -Stable knee exam.  No effusion noted.  -Home exercises provided and discussed.  -Referral to physical therapy for evaluation and recommendations.    Orders:  -     Ambulatory Referral to Physical Therapy; Future    4. Back muscle spasm  -     methocarbamol (ROBAXIN) 500 mg tablet; Take 1 tablet (500 mg total) by mouth 4 (four) times a day           Talib Lovell is a 54-year-old male presents today for follow-up.  He notes he is taking all his medications as prescribed.  He admits he has been monitoring his blood pressure at home and is  well-controlled.  Denies any episodes of high or low blood pressure.  He does admit that recently had flare of his back pain.  This is not a new injury.  This happened over 3 years ago where he was shoveling some snow and had right sided muscle tightness.  He did change activity and do some stretching which relieved however continues with occasional flareups.  Denies any red flag symptoms of back pain.  Denies any sciatica symptoms.  Also notes that he recently was moving furniture and bumped his left knee.  Did have some initial swelling however resolved.  He is able to bear weight without any concern.  However he notes with flexion over 90 degrees that having a little bit of stretching feeling and some tightness.  Denies any catching or locking sensations but no popping.  Denies any unwanted weight loss or night sweats.  Denies any other symptoms.      Review of Systems   Constitutional:  Negative for chills and fever.   HENT:  Negative for ear pain and sore throat.    Eyes:  Negative for pain and visual disturbance.   Respiratory:  Negative for cough and shortness of breath.    Cardiovascular:  Negative for chest pain and palpitations.   Gastrointestinal:  Negative for abdominal pain and vomiting.   Genitourinary:  Negative for dysuria and hematuria.   Musculoskeletal:  Positive for back pain. Negative for arthralgias.        Left knee pain   Skin:  Negative for color change and rash.   Neurological:  Negative for seizures and syncope.   Psychiatric/Behavioral:  Negative for confusion and sleep disturbance. The patient is not nervous/anxious.    All other systems reviewed and are negative.      History reviewed. No pertinent past medical history.  History reviewed. No pertinent surgical history.  Family History   Problem Relation Age of Onset    Diabetes Mother     Hypertension Father         Benign essential    Hypertension Brother      Social History     Socioeconomic History    Marital status:       "Spouse name: None    Number of children: 1    Years of education: College Grad    Highest education level: None   Occupational History    Occupation:    Tobacco Use    Smoking status: Former     Current packs/day: 0.00     Types: Cigarettes     Quit date:      Years since quittin.3    Smokeless tobacco: Never   Vaping Use    Vaping status: Never Used   Substance and Sexual Activity    Alcohol use: No    Drug use: No    Sexual activity: Yes     Partners: Female   Other Topics Concern    None   Social History Narrative    1 step-child, 1 child     Social Determinants of Health     Financial Resource Strain: Not on file   Food Insecurity: Not on file   Transportation Needs: Not on file   Physical Activity: Not on file   Stress: Not on file   Social Connections: Not on file   Intimate Partner Violence: Not on file   Housing Stability: Not on file     Current Outpatient Medications on File Prior to Visit   Medication Sig    multivitamin (THERAGRAN) TABS Take 1 tablet by mouth daily    Omega-3 Fatty Acids (FISH OIL) 1200 MG CAPS Take by mouth    [DISCONTINUED] lisinopril (ZESTRIL) 5 mg tablet TAKE 1 TABLET BY MOUTH DAILY     Allergies   Allergen Reactions    Seasonal Ic [Cholestatin]      Immunization History   Administered Date(s) Administered    COVID-19 PFIZER VACCINE 0.3 ML IM 2021, 2021    INFLUENZA 10/28/2015, 2016, 10/11/2017    Influenza Quadrivalent Preservative Free 3 years and older IM 10/21/2014, 2016    Influenza Quadrivalent, 6-35 Months IM 10/28/2015    Influenza, injectable, quadrivalent, preservative free 0.5 mL 10/24/2018, 10/23/2019    Influenza, recombinant, quadrivalent,injectable, preservative free 2020, 10/06/2021    Influenza, seasonal, injectable 10/11/2017    Tdap 2009, 2020       Objective     /98 (BP Location: Left arm, Patient Position: Sitting)   Pulse 94   Temp (!) 97.4 °F (36.3 °C) (Tympanic)   Resp 16   Ht 5' 11\" " (1.803 m)   Wt 113 kg (248 lb 6.4 oz)   SpO2 97%   BMI 34.64 kg/m²     Physical Exam  Vitals and nursing note reviewed.   Constitutional:       General: He is not in acute distress.     Appearance: Normal appearance. He is not ill-appearing.   HENT:      Head: Normocephalic and atraumatic.      Right Ear: Tympanic membrane and external ear normal.      Left Ear: Tympanic membrane normal.      Nose: Nose normal. No congestion.      Mouth/Throat:      Mouth: Mucous membranes are moist.      Pharynx: No oropharyngeal exudate.   Eyes:      Extraocular Movements: Extraocular movements intact.      Conjunctiva/sclera: Conjunctivae normal.      Pupils: Pupils are equal, round, and reactive to light.   Cardiovascular:      Rate and Rhythm: Normal rate and regular rhythm.      Pulses: Normal pulses.      Heart sounds: Normal heart sounds. No murmur heard.  Pulmonary:      Effort: Pulmonary effort is normal.      Breath sounds: Normal breath sounds. No wheezing, rhonchi or rales.   Abdominal:      General: Bowel sounds are normal.      Palpations: Abdomen is soft.      Tenderness: There is no abdominal tenderness. There is no guarding or rebound.   Musculoskeletal:         General: Normal range of motion.      Cervical back: Normal range of motion.      Right lower leg: No edema.      Left lower leg: No edema.      Comments: Right lumbar: Hypertonic paraspinal muscles.  Straight leg raise negative.    Left knee: No effusion or edema noted.  Stable knee exam.  No joint line tenderness   Lymphadenopathy:      Cervical: No cervical adenopathy.   Skin:     General: Skin is warm.      Capillary Refill: Capillary refill takes less than 2 seconds.      Findings: No erythema.   Neurological:      General: No focal deficit present.      Mental Status: He is alert and oriented to person, place, and time.      Cranial Nerves: No cranial nerve deficit.      Motor: No weakness.   Psychiatric:         Mood and Affect: Mood normal.          Behavior: Behavior normal.       Shan Dowd, DO

## 2024-04-23 NOTE — ASSESSMENT & PLAN NOTE
- Off and on for 3 years after straining back during winter shoveling a few years ago.  Does note he has tried some stretching without much relief.  -Denies any red flag symptoms of back pain.  Denies any sciatica.  -Home exercises provided.  -Will trial Robaxin 500 mg 4 times daily as needed for muscle spasm.  -Referral to physical therapy

## 2024-04-23 NOTE — ASSESSMENT & PLAN NOTE
- Elevated in office today.  Does monitor consistently at home and below 130/80.   -Continue current regimen lisinopril 5 mg p.o. daily  -Continue low-salt, low-carb, low sugar, high-fiber, whole food eating.  -Discussed recommended activity level with a goal of 30 minutes moderate intensity exercise 5 days/week.  -Follow-up in 6 months.

## 2024-04-23 NOTE — ASSESSMENT & PLAN NOTE
- Notes he bumped his knee while moving furniture about 4 weeks ago.  Swelling has gone way down however he feels some tightness in his quadricep.  -Stable knee exam.  No effusion noted.  -Home exercises provided and discussed.  -Referral to physical therapy for evaluation and recommendations.

## 2024-04-23 NOTE — PATIENT INSTRUCTIONS
Magnesium L-threonate - 400 mg at bedtime  Vitamin D - 3000 Ius per day    Lower Back Exercises   WHAT YOU NEED TO KNOW:   Lower back exercises help heal and strengthen your back muscles to prevent another injury. Ask your healthcare provider if you need to see a physical therapist for more advanced exercises.   DISCHARGE INSTRUCTIONS:   Return to the emergency department if:   You have severe pain that prevents you from moving.       Call your doctor if:   Your pain becomes worse.    You have new pain.    You have questions or concerns about your condition or care.    Do lower back exercises safely:   Do the exercises on a mat or firm surface (not on a bed).  A firm surface will support your spine and prevent low back pain.    Move slowly and smoothly.  Avoid fast or jerky motions.    Breathe normally.  Do not hold your breath.    Stop if you feel pain.  It is normal to feel some discomfort at first, but you should not feel pain. Regular exercise will help decrease your discomfort over time.    Lower back exercises:  Your healthcare provider may recommend that you do back exercises 10 to 30 minutes each day. He or she may also recommend that you do exercises 1 to 3 times each day. Ask your provider which exercises are best for you and how often to do them.  Ankle pumps:  Lie on your back. Move your foot up (with your toes pointing toward your head). Then, move your foot down (with your toes pointing away from you). Repeat this exercise 10 times on each side.         Heel slides:  Lie on your back. Slowly bend one leg and then straighten it. Next, bend the other leg and then straighten it. Repeat 10 times on each side.         Pelvic tilt:  Lie on your back with your knees bent and feet flat on the floor. Place your arms in a relaxed position beside your body. Tighten the muscles of your abdomen and flatten your back against the floor. Hold for 5 seconds. Repeat 5 times.         Back stretch:  Lie on your back with  your hands behind your head. Bend your knees and turn the lower half of your body to one side. Hold this position for 10 seconds. Repeat 3 times on each side.         Straight leg raises:  Lie on your back with one leg straight. Bend the other knee. Tighten your abdomen and then slowly lift the straight leg up about 6 to 12 inches off the floor. Hold for 1 to 5 seconds. Lower your leg slowly. Repeat 10 times on each leg.         Knee-to-chest:  Lie on your back with your knees bent and feet flat on the floor. Pull one of your knees toward your chest and hold it there for 5 seconds. Return your leg to the starting position. Lift the other knee toward your chest and hold for 5 seconds. Do this 5 times on each side.         Cat and camel:  Place your hands and knees on the floor. Arch your back upward toward the ceiling and lower your head. Round out your spine as much as you can. Hold for 5 seconds. Lift your head upward and push your chest downward toward the floor. Hold for 5 seconds. Do 3 sets or as directed.         Wall squats:  Stand with your back against a wall. Tighten the muscles of your abdomen. Slowly lower your body until your knees are bent at a 45 degree angle. Hold this position for 5 seconds. Slowly move back up to a standing position. Repeat 10 times.         Curl up:  Lie on your back with your knees bent and feet flat on the floor. Place your hands, palms down, underneath the curve in your lower back. Next, with your elbows on the floor, lift your shoulders and chest 2 to 3 inches. Keep your head in line with your shoulders. Hold this position for 5 seconds. When you can do this exercise without pain for 10 to 15 seconds, you may add a rotation. While your shoulders and chest are lifted off the ground, turn slightly to the left and hold. Repeat on the other side.         Bird dog:  Place your hands and knees on the floor. Keep your wrists directly below your shoulders and your knees directly below  your hips. Pull your belly button in toward your spine. Do not flatten or arch your back. Tighten your abdominal muscles. Raise one arm straight out so that it is aligned with your head. Next, raise the leg opposite your arm. Hold this position for 15 seconds. Lower your arm and leg slowly and change sides. Do 5 sets.       Follow up with your doctor as directed:  Write down your questions so you remember to ask them during your visits.  © Copyright [employer] 2023 Information is for End User's use only and may not be sold, redistributed or otherwise used for commercial purposes.  The above information is an  only. It is not intended as medical advice for individual conditions or treatments. Talk to your doctor, nurse or pharmacist before following any medical regimen to see if it is safe and effective for you.

## 2025-03-26 DIAGNOSIS — I10 ESSENTIAL HYPERTENSION: ICD-10-CM

## 2025-03-27 RX ORDER — LISINOPRIL 5 MG/1
5 TABLET ORAL DAILY
Qty: 30 TABLET | Refills: 0 | Status: SHIPPED | OUTPATIENT
Start: 2025-03-27

## 2025-04-27 DIAGNOSIS — I10 ESSENTIAL HYPERTENSION: ICD-10-CM

## 2025-04-29 ENCOUNTER — TELEPHONE (OUTPATIENT)
Dept: FAMILY MEDICINE CLINIC | Facility: CLINIC | Age: 55
End: 2025-04-29

## 2025-04-29 DIAGNOSIS — Z13.1 SCREENING FOR DIABETES MELLITUS (DM): ICD-10-CM

## 2025-04-29 DIAGNOSIS — Z12.5 SCREENING PSA (PROSTATE SPECIFIC ANTIGEN): ICD-10-CM

## 2025-04-29 DIAGNOSIS — E55.9 VITAMIN D DEFICIENCY: Primary | ICD-10-CM

## 2025-04-29 DIAGNOSIS — Z13.6 SCREENING FOR CARDIOVASCULAR CONDITION: ICD-10-CM

## 2025-04-29 RX ORDER — LISINOPRIL 5 MG/1
5 TABLET ORAL DAILY
Qty: 30 TABLET | Refills: 0 | Status: SHIPPED | OUTPATIENT
Start: 2025-04-29

## 2025-04-29 NOTE — TELEPHONE ENCOUNTER
Pt has a physical scheduled in June and was asking if labs can be ordered so he can get them done prior to next visit. Pt uses [facility]. Please advise

## 2025-04-29 NOTE — TELEPHONE ENCOUNTER
Can we please give him a call and let him know that scripts were put in for fasting blood work to be done prior to his visit..  Thank you.

## 2025-05-26 DIAGNOSIS — I10 ESSENTIAL HYPERTENSION: ICD-10-CM

## 2025-05-28 RX ORDER — LISINOPRIL 5 MG/1
5 TABLET ORAL DAILY
Qty: 90 TABLET | Refills: 0 | Status: SHIPPED | OUTPATIENT
Start: 2025-05-28

## 2025-06-20 ENCOUNTER — APPOINTMENT (OUTPATIENT)
Dept: LAB | Facility: CLINIC | Age: 55
End: 2025-06-20
Payer: COMMERCIAL

## 2025-06-20 ENCOUNTER — RESULTS FOLLOW-UP (OUTPATIENT)
Dept: FAMILY MEDICINE CLINIC | Facility: CLINIC | Age: 55
End: 2025-06-20

## 2025-06-20 ENCOUNTER — APPOINTMENT (OUTPATIENT)
Dept: LAB | Facility: MEDICAL CENTER | Age: 55
End: 2025-06-20
Payer: COMMERCIAL

## 2025-06-20 DIAGNOSIS — Z13.6 SCREENING FOR CARDIOVASCULAR CONDITION: ICD-10-CM

## 2025-06-20 DIAGNOSIS — Z12.5 SCREENING PSA (PROSTATE SPECIFIC ANTIGEN): ICD-10-CM

## 2025-06-20 DIAGNOSIS — E55.9 VITAMIN D DEFICIENCY: ICD-10-CM

## 2025-06-20 DIAGNOSIS — Z13.1 SCREENING FOR DIABETES MELLITUS (DM): ICD-10-CM

## 2025-06-20 LAB
25(OH)D3 SERPL-MCNC: 15.9 NG/ML (ref 30–100)
ALBUMIN SERPL BCG-MCNC: 4.6 G/DL (ref 3.5–5)
ALP SERPL-CCNC: 103 U/L (ref 34–104)
ALT SERPL W P-5'-P-CCNC: 47 U/L (ref 7–52)
ANION GAP SERPL CALCULATED.3IONS-SCNC: 10 MMOL/L (ref 4–13)
AST SERPL W P-5'-P-CCNC: 25 U/L (ref 13–39)
BILIRUB SERPL-MCNC: 0.71 MG/DL (ref 0.2–1)
BUN SERPL-MCNC: 13 MG/DL (ref 5–25)
CALCIUM SERPL-MCNC: 9.2 MG/DL (ref 8.4–10.2)
CHLORIDE SERPL-SCNC: 102 MMOL/L (ref 96–108)
CHOLEST SERPL-MCNC: 194 MG/DL (ref ?–200)
CO2 SERPL-SCNC: 26 MMOL/L (ref 21–32)
CREAT SERPL-MCNC: 0.83 MG/DL (ref 0.6–1.3)
GFR SERPL CREATININE-BSD FRML MDRD: 99 ML/MIN/1.73SQ M
GLUCOSE P FAST SERPL-MCNC: 136 MG/DL (ref 65–99)
HDLC SERPL-MCNC: 37 MG/DL
LDLC SERPL CALC-MCNC: 114 MG/DL (ref 0–100)
POTASSIUM SERPL-SCNC: 4.2 MMOL/L (ref 3.5–5.3)
PROT SERPL-MCNC: 7.2 G/DL (ref 6.4–8.4)
PSA SERPL-MCNC: 0.49 NG/ML (ref 0–4)
SODIUM SERPL-SCNC: 138 MMOL/L (ref 135–147)
TRIGL SERPL-MCNC: 217 MG/DL (ref ?–150)

## 2025-06-20 PROCEDURE — 80061 LIPID PANEL: CPT

## 2025-06-20 PROCEDURE — 82306 VITAMIN D 25 HYDROXY: CPT

## 2025-06-20 PROCEDURE — 36415 COLL VENOUS BLD VENIPUNCTURE: CPT

## 2025-06-20 PROCEDURE — G0103 PSA SCREENING: HCPCS

## 2025-06-20 PROCEDURE — 80053 COMPREHEN METABOLIC PANEL: CPT

## 2025-06-25 ENCOUNTER — OFFICE VISIT (OUTPATIENT)
Dept: FAMILY MEDICINE CLINIC | Facility: CLINIC | Age: 55
End: 2025-06-25
Payer: COMMERCIAL

## 2025-06-25 VITALS
TEMPERATURE: 97.4 F | HEIGHT: 71 IN | RESPIRATION RATE: 16 BRPM | HEART RATE: 103 BPM | DIASTOLIC BLOOD PRESSURE: 88 MMHG | OXYGEN SATURATION: 96 % | WEIGHT: 249 LBS | SYSTOLIC BLOOD PRESSURE: 138 MMHG | BODY MASS INDEX: 34.86 KG/M2

## 2025-06-25 DIAGNOSIS — R68.82 LOW LIBIDO: ICD-10-CM

## 2025-06-25 DIAGNOSIS — M54.50 CHRONIC RIGHT-SIDED LOW BACK PAIN WITHOUT SCIATICA: ICD-10-CM

## 2025-06-25 DIAGNOSIS — E55.9 VITAMIN D DEFICIENCY: ICD-10-CM

## 2025-06-25 DIAGNOSIS — Z00.00 ANNUAL PHYSICAL EXAM: ICD-10-CM

## 2025-06-25 DIAGNOSIS — G89.29 CHRONIC RIGHT-SIDED LOW BACK PAIN WITHOUT SCIATICA: ICD-10-CM

## 2025-06-25 DIAGNOSIS — N50.89 TESTICULAR LUMP: ICD-10-CM

## 2025-06-25 DIAGNOSIS — I10 ESSENTIAL HYPERTENSION: ICD-10-CM

## 2025-06-25 DIAGNOSIS — M77.11 RIGHT LATERAL EPICONDYLITIS: Primary | ICD-10-CM

## 2025-06-25 DIAGNOSIS — R53.83 OTHER FATIGUE: ICD-10-CM

## 2025-06-25 PROCEDURE — 99214 OFFICE O/P EST MOD 30 MIN: CPT | Performed by: FAMILY MEDICINE

## 2025-06-25 PROCEDURE — 99396 PREV VISIT EST AGE 40-64: CPT | Performed by: FAMILY MEDICINE

## 2025-06-25 RX ORDER — LISINOPRIL 5 MG/1
5 TABLET ORAL DAILY
Qty: 90 TABLET | Refills: 0 | Status: SHIPPED | OUTPATIENT
Start: 2025-06-25

## 2025-06-25 RX ORDER — ERGOCALCIFEROL 1.25 MG/1
50000 CAPSULE, LIQUID FILLED ORAL WEEKLY
Qty: 12 CAPSULE | Refills: 0 | Status: SHIPPED | OUTPATIENT
Start: 2025-06-25

## 2025-06-25 NOTE — PATIENT INSTRUCTIONS
"Patient Education     Routine physical for adults   The Basics   Written by the doctors and editors at [facility]   What is a physical? -- A physical is a routine visit, or \"check-up,\" with your doctor. You might also hear it called a \"wellness visit\" or \"preventive visit.\"  During each visit, the doctor will:   Ask about your physical and mental health   Ask about your habits, behaviors, and lifestyle   Do an exam   Give you vaccines if needed   Talk to you about any medicines you take   Give advice about your health   Answer your questions  Getting regular check-ups is an important part of taking care of your health. It can help your doctor find and treat any problems you have. But it's also important for preventing health problems.  A routine physical is different from a \"sick visit.\" A sick visit is when you see a doctor because of a health concern or problem. Since physicals are scheduled ahead of time, you can think about what you want to ask the doctor.  How often should I get a physical? -- It depends on your age and health. In general, for people age 21 years and older:   If you are younger than 50 years, you might be able to get a physical every 3 years.   If you are 50 years or older, your doctor might recommend a physical every year.  If you have an ongoing health condition, like diabetes or high blood pressure, your doctor will probably want to see you more often.  What happens during a physical? -- In general, each visit will include:   Physical exam - The doctor or nurse will check your height, weight, heart rate, and blood pressure. They will also look at your eyes and ears. They will ask about how you are feeling and whether you have any symptoms that bother you.   Medicines - It's a good idea to bring a list of all the medicines you take to each doctor visit. Your doctor will talk to you about your medicines and answer any questions. Tell them if you are having any side effects that bother you. You " "should also tell them if you are having trouble paying for any of your medicines.   Habits and behaviors - This includes:   Your diet   Your exercise habits   Whether you smoke, drink alcohol, or use drugs   Whether you are sexually active   Whether you feel safe at home  Your doctor will talk to you about things you can do to improve your health and lower your risk of health problems. They will also offer help and support. For example, if you want to quit smoking, they can give you advice and might prescribe medicines. If you want to improve your diet or get more physical activity, they can help you with this, too.   Lab tests, if needed - The tests you get will depend on your age and situation. For example, your doctor might want to check your:   Cholesterol   Blood sugar   Iron level   Vaccines - The recommended vaccines will depend on your age, health, and what vaccines you already had. Vaccines are very important because they can prevent certain serious or deadly infections.   Discussion of screening - \"Screening\" means checking for diseases or other health problems before they cause symptoms. Your doctor can recommend screening based on your age, risk, and preferences. This might include tests to check for:   Cancer, such as breast, prostate, cervical, ovarian, colorectal, prostate, lung, or skin cancer   Sexually transmitted infections, such as chlamydia and gonorrhea   Mental health conditions like depression and anxiety  Your doctor will talk to you about the different types of screening tests. They can help you decide which screenings to have. They can also explain what the results might mean.   Answering questions - The physical is a good time to ask the doctor or nurse questions about your health. If needed, they can refer you to other doctors or specialists, too.  Adults older than 65 years often need other care, too. As you get older, your doctor will talk to you about:   How to prevent falling at " home   Hearing or vision tests   Memory testing   How to take your medicines safely   Making sure that you have the help and support you need at home  All topics are updated as new evidence becomes available and our peer review process is complete.  This topic retrieved from [employer] on: May 02, 2024.  Topic 716800 Version 1.0  Release: 32.4.3 - C32.122  © 2024 UpToDate, Inc. and/or its affiliates. All rights reserved.  Consumer Information Use and Disclaimer   Disclaimer: This generalized information is a limited summary of diagnosis, treatment, and/or medication information. It is not meant to be comprehensive and should be used as a tool to help the user understand and/or assess potential diagnostic and treatment options. It does NOT include all information about conditions, treatments, medications, side effects, or risks that may apply to a specific patient. It is not intended to be medical advice or a substitute for the medical advice, diagnosis, or treatment of a health care provider based on the health care provider's examination and assessment of a patient's specific and unique circumstances. Patients must speak with a health care provider for complete information about their health, medical questions, and treatment options, including any risks or benefits regarding use of medications. This information does not endorse any treatments or medications as safe, effective, or approved for treating a specific patient. UpToDate, Inc. and its affiliates disclaim any warranty or liability relating to this information or the use thereof.The use of this information is governed by the Terms of Use, available at https://www.[employer] Universityuwer.com/en/know/clinical-effectiveness-terms. 2024© UpToDate, Inc. and its affiliates and/or licensors. All rights reserved.  Copyright   © 2024 UpToDate, Inc. and/or its affiliates. All rights reserved.

## 2025-06-25 NOTE — PROGRESS NOTES
Adult Annual Physical  Name: James Berman Jr.      : 1970      MRN: [id number]  Encounter Provider: Shan Dowd DO  Encounter Date: 2025   Encounter department: [facility]    :  Assessment & Plan  Essential hypertension  Stable.  Continue current management with lisinopril 5 mg p.o. daily.  -Continue diet and lifestyle interventions  - Follow up in 6 months    Orders:  •  lisinopril (ZESTRIL) 5 mg tablet; Take 1 tablet (5 mg total) by mouth daily    Annual physical exam         Vitamin D deficiency  Discussed vitamin D supplementation.  We will prescription strength and transition to daily supplementation once completed.    Orders:  •  ergocalciferol (VITAMIN D2) 50,000 units; Take 1 capsule (50,000 Units total) by mouth once a week    Chronic right-sided low back pain without sciatica  - Notes chronic pain mostly on the right side.  No new injury or trauma.  Has any radiation of the pain into his legs, no burning or numbness.  -Will get x-rays to evaluate joint space.  -Home exercise program reviewed and provided  -Discussed formal physical therapy, he will hold off for now.  Orders:  •  XR spine lumbar minimum 4 views non injury; Future    Right lateral epicondylitis  -Noting increase in right lateral elbow pain.  Has been doing some home projects recently with flareup.  -Denies any falls, trauma.  -Home exercises provided and reviewed.  -Continue NSAIDs as discussed  -Follow-up with orthopedics if no improvement  Orders:  •  Ambulatory Referral to Orthopedic Surgery; Future    Low libido    Orders:  •  Testosterone, free, total; Future    Other fatigue    Orders:  •  Testosterone, free, total; Future    Testicular lump  Noted a lump on his left testicle.  Denies any pain or tenderness.  Denies any trauma.  No concerns for STI  - Will get ultrasound to further evaluate.  Orders:  •  US scrotum and testicles; Future        Preventive Screenings:  - Diabetes Screening:  screening up-to-date and orders placed  - Cholesterol Screening: orders placed   - Hepatitis C screening: screening up-to-date   - HIV screening: patient declines   - Colon cancer screening: screening up-to-date   - Lung cancer screening: screening not indicated   - Prostate cancer screening: screening up-to-date     Immunizations:  - Immunizations due: Prevnar 20 and Zoster (Shingrix)  - Risks/benefits immunizations discussed      Counseling/Anticipatory Guidance:  - Alcohol: discussed moderation in alcohol intake and recommendations for healthy alcohol use.   - Drug use: discussed harms of illicit drug use and how it can negatively impact mental/physical health.   - Tobacco use: discussed harms of tobacco use and management options for quitting.   - Dental health: discussed importance of regular tooth brushing, flossing, and dental visits.   - Sexual health: discussed sexually transmitted diseases, partner selection, use of condoms, avoidance of unintended pregnancy, and contraceptive alternatives.   - Diet: discussed recommendations for a healthy/well-balanced diet.   - Exercise: the importance of regular exercise/physical activity was discussed. Recommend exercise 3-5 times per week for at least 30 minutes.          History of Present Illness     Adult Annual Physical:  Patient presents for annual physical.     Diet and Physical Activity:  - Diet/Nutrition: poor diet.  - Exercise: walking, moderate cardiovascular exercise and 30-60 minutes on average.    General Health:  - Sleep: 7-8 hours of sleep on average.  - Hearing: normal hearing bilateral ears.  - Vision: no vision problems.  - Dental: regular dental visits, brushes teeth twice daily and floss regularly.    /GYN Health:  - Follows with GYN: no.   - History of STDs: no     Health:  - History of STDs: no.   - Urinary symptoms: none.     Advanced Care Planning:  - Has an advanced directive?: yes    - Has a durable medical POA?: no    - ACP document  "given to patient?: no      Review of Systems   Constitutional:  Negative for chills and fever.   HENT:  Negative for ear pain and sore throat.    Eyes:  Negative for pain and visual disturbance.   Respiratory:  Negative for cough and shortness of breath.    Cardiovascular:  Negative for chest pain and palpitations.   Gastrointestinal:  Negative for abdominal pain and vomiting.   Genitourinary:  Negative for dysuria, hematuria and testicular pain.        Testicular lump   Musculoskeletal:  Positive for back pain. Negative for arthralgias.        Right elbow pain   Skin:  Negative for color change and rash.   Neurological:  Negative for seizures and syncope.   Psychiatric/Behavioral:  Negative for confusion, sleep disturbance and suicidal ideas. The patient is not nervous/anxious.    All other systems reviewed and are negative.        Objective   /88 (BP Location: Left arm, Patient Position: Sitting, Cuff Size: Large)   Pulse 103   Temp (!) 97.4 °F (36.3 °C) (Tympanic)   Resp 16   Ht 5' 10.59\" (1.793 m)   Wt 113 kg (249 lb)   SpO2 96%   BMI 35.13 kg/m²     Physical Exam  Vitals and nursing note reviewed.   Constitutional:       General: He is not in acute distress.     Appearance: Normal appearance.   HENT:      Head: Normocephalic and atraumatic.      Right Ear: Tympanic membrane and external ear normal.      Left Ear: Tympanic membrane and external ear normal.      Nose: Nose normal.      Mouth/Throat:      Mouth: Mucous membranes are moist.     Eyes:      Extraocular Movements: Extraocular movements intact.      Conjunctiva/sclera: Conjunctivae normal.      Pupils: Pupils are equal, round, and reactive to light.       Cardiovascular:      Rate and Rhythm: Normal rate and regular rhythm.      Pulses: Normal pulses.      Heart sounds: No murmur heard.  Pulmonary:      Effort: Pulmonary effort is normal.      Breath sounds: Normal breath sounds. No wheezing, rhonchi or rales.   Abdominal:      General: " Bowel sounds are normal.      Palpations: Abdomen is soft.      Tenderness: There is no abdominal tenderness. There is no guarding.   Genitourinary:     Penis: Normal.       Comments: Testicles appear normal lie, no evidence of infection no torsion.    Musculoskeletal:         General: Normal range of motion.      Cervical back: Normal range of motion.      Right lower leg: No edema.      Left lower leg: No edema.   Lymphadenopathy:      Cervical: No cervical adenopathy.     Skin:     General: Skin is warm.      Capillary Refill: Capillary refill takes less than 2 seconds.     Neurological:      General: No focal deficit present.      Mental Status: He is alert and oriented to person, place, and time.     Psychiatric:         Mood and Affect: Mood normal.         Behavior: Behavior normal.

## 2025-06-30 NOTE — ASSESSMENT & PLAN NOTE
- Notes chronic pain mostly on the right side.  No new injury or trauma.  Has any radiation of the pain into his legs, no burning or numbness.  -Will get x-rays to evaluate joint space.  -Home exercise program reviewed and provided  -Discussed formal physical therapy, he will hold off for now.  Orders:  •  XR spine lumbar minimum 4 views non injury; Future

## 2025-06-30 NOTE — ASSESSMENT & PLAN NOTE
Stable.  Continue current management with lisinopril 5 mg p.o. daily.  -Continue diet and lifestyle interventions  - Follow up in 6 months    Orders:  •  lisinopril (ZESTRIL) 5 mg tablet; Take 1 tablet (5 mg total) by mouth daily

## 2025-07-31 ENCOUNTER — HOSPITAL ENCOUNTER (OUTPATIENT)
Dept: ULTRASOUND IMAGING | Facility: HOSPITAL | Age: 55
Discharge: HOME | End: 2025-07-31
Payer: COMMERCIAL

## 2025-07-31 ENCOUNTER — APPOINTMENT (OUTPATIENT)
Dept: RADIOLOGY | Facility: CLINIC | Age: 55
End: 2025-07-31
Payer: COMMERCIAL

## 2025-07-31 DIAGNOSIS — M54.50 CHRONIC RIGHT-SIDED LOW BACK PAIN WITHOUT SCIATICA: ICD-10-CM

## 2025-07-31 DIAGNOSIS — N50.89 TESTICULAR LUMP: ICD-10-CM

## 2025-07-31 DIAGNOSIS — G89.29 CHRONIC RIGHT-SIDED LOW BACK PAIN WITHOUT SCIATICA: ICD-10-CM

## 2025-07-31 PROCEDURE — 72110 X-RAY EXAM L-2 SPINE 4/>VWS: CPT

## 2025-07-31 PROCEDURE — 76870 US EXAM SCROTUM: CPT

## 2025-08-06 ENCOUNTER — APPOINTMENT (OUTPATIENT)
Dept: RADIOLOGY | Facility: AMBULARY SURGERY CENTER | Age: 55
End: 2025-08-06
Attending: ORTHOPAEDIC SURGERY
Payer: COMMERCIAL

## 2025-08-06 ENCOUNTER — OFFICE VISIT (OUTPATIENT)
Dept: OBGYN CLINIC | Facility: CLINIC | Age: 55
End: 2025-08-06
Attending: FAMILY MEDICINE
Payer: COMMERCIAL

## 2025-08-06 VITALS — WEIGHT: 253 LBS | HEIGHT: 71 IN | BODY MASS INDEX: 35.42 KG/M2

## 2025-08-06 DIAGNOSIS — M25.521 PAIN IN RIGHT ELBOW: Primary | ICD-10-CM

## 2025-08-06 DIAGNOSIS — M77.11 RIGHT LATERAL EPICONDYLITIS: ICD-10-CM

## 2025-08-06 PROCEDURE — 99204 OFFICE O/P NEW MOD 45 MIN: CPT | Performed by: ORTHOPAEDIC SURGERY

## 2025-08-22 DIAGNOSIS — I10 ESSENTIAL HYPERTENSION: ICD-10-CM

## 2025-08-22 RX ORDER — LISINOPRIL 5 MG/1
5 TABLET ORAL DAILY
Qty: 90 TABLET | Refills: 1 | Status: SHIPPED | OUTPATIENT
Start: 2025-08-22